# Patient Record
Sex: MALE | Race: BLACK OR AFRICAN AMERICAN | NOT HISPANIC OR LATINO | Employment: FULL TIME | URBAN - METROPOLITAN AREA
[De-identification: names, ages, dates, MRNs, and addresses within clinical notes are randomized per-mention and may not be internally consistent; named-entity substitution may affect disease eponyms.]

---

## 2022-01-21 ENCOUNTER — OFFICE VISIT (OUTPATIENT)
Dept: FAMILY MEDICINE | Facility: CLINIC | Age: 37
End: 2022-01-21
Payer: COMMERCIAL

## 2022-01-21 ENCOUNTER — ANCILLARY PROCEDURE (OUTPATIENT)
Dept: GENERAL RADIOLOGY | Facility: CLINIC | Age: 37
End: 2022-01-21
Attending: NURSE PRACTITIONER
Payer: COMMERCIAL

## 2022-01-21 VITALS
WEIGHT: 140 LBS | SYSTOLIC BLOOD PRESSURE: 122 MMHG | TEMPERATURE: 98.6 F | BODY MASS INDEX: 23.32 KG/M2 | HEIGHT: 65 IN | OXYGEN SATURATION: 97 % | DIASTOLIC BLOOD PRESSURE: 70 MMHG | HEART RATE: 69 BPM

## 2022-01-21 DIAGNOSIS — G89.29 CHRONIC MIDLINE LOW BACK PAIN WITHOUT SCIATICA: ICD-10-CM

## 2022-01-21 DIAGNOSIS — M54.50 CHRONIC MIDLINE LOW BACK PAIN WITHOUT SCIATICA: Primary | ICD-10-CM

## 2022-01-21 DIAGNOSIS — G89.29 CHRONIC MIDLINE LOW BACK PAIN WITHOUT SCIATICA: Primary | ICD-10-CM

## 2022-01-21 DIAGNOSIS — M54.50 CHRONIC MIDLINE LOW BACK PAIN WITHOUT SCIATICA: ICD-10-CM

## 2022-01-21 DIAGNOSIS — R51.9 NONINTRACTABLE HEADACHE, UNSPECIFIED CHRONICITY PATTERN, UNSPECIFIED HEADACHE TYPE: ICD-10-CM

## 2022-01-21 DIAGNOSIS — M54.2 NECK PAIN: ICD-10-CM

## 2022-01-21 DIAGNOSIS — R90.89 ABNORMAL FINDING ON MRI OF BRAIN: ICD-10-CM

## 2022-01-21 PROCEDURE — 72100 X-RAY EXAM L-S SPINE 2/3 VWS: CPT | Mod: FY | Performed by: RADIOLOGY

## 2022-01-21 PROCEDURE — 99204 OFFICE O/P NEW MOD 45 MIN: CPT | Performed by: NURSE PRACTITIONER

## 2022-01-21 PROCEDURE — 72040 X-RAY EXAM NECK SPINE 2-3 VW: CPT | Mod: FY | Performed by: RADIOLOGY

## 2022-01-21 RX ORDER — METHOCARBAMOL 500 MG/1
500 TABLET, FILM COATED ORAL 2 TIMES DAILY
Status: CANCELLED | OUTPATIENT
Start: 2022-01-21

## 2022-01-21 RX ORDER — SUMATRIPTAN 25 MG/1
25-50 TABLET, FILM COATED ORAL
Qty: 9 TABLET | Refills: 1 | Status: SHIPPED | OUTPATIENT
Start: 2022-01-21 | End: 2022-07-08

## 2022-01-21 ASSESSMENT — ENCOUNTER SYMPTOMS: HEADACHES: 1

## 2022-01-21 ASSESSMENT — MIFFLIN-ST. JEOR: SCORE: 1488.98
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
## 2022-01-21 NOTE — LETTER
February 2, 2022      Shweta Nj  [address]  [place] [address]        Dear ,    We are writing to inform you of your test results.  New patient to clinic not planning to establish care with 10-year history of headaches worsening.  Also having low back pain.  X-rays of his neck and back are completely normal.  Please encourage him to complete the MRI and labs I ordered likely at the  since he lives in the [place] area?  He also should set up with someone in that area as a primary care for follow-up.     Resulted Orders   XR Cervical Spine 2/3 Views    Narrative    CERVICAL SPINE TWO TO THREE VIEWS  1/21/2022 4:52 PM     COMPARISON: None.    HISTORY: Neck pain.      Impression    IMPRESSION: There is normal alignment of the cervical vertebrae.  Vertebral body heights of the cervical spine are normal.  Craniocervical alignment is normal. There are no fractures of the  cervical spine.    TOBIAS JENSEN MD         SYSTEM ID:  [id number]       If you have any questions or concerns, please call the clinic at the number listed above.       Sincerely,      BRYSON Ruggiero CNP

## 2022-01-24 NOTE — RESULT ENCOUNTER NOTE
Note to Staff: please call the patient to explain results.  Will need .    New patient to clinic not planning to establish care with 10-year history of headaches worsening.  Also having low back pain.  X-rays of his neck and back are completely normal.  Please encourage him to complete the MRI and labs I ordered likely at the  since he lives in the [place] area?  He also should set up with someone in that area as a primary care for follow-up.     Prescribed him a very small amount of Imitrex to be used only very sparingly as needed for severe migraine type headache pain to see if he has benefit.     Collette Harper, FNP-BC

## 2022-02-03 ENCOUNTER — TELEPHONE (OUTPATIENT)
Dept: LAB | Facility: CLINIC | Age: 37
End: 2022-02-03
Payer: COMMERCIAL

## 2022-02-03 NOTE — TELEPHONE ENCOUNTER
Situation:  Patient called to review results  Background:  MRI scheduled     Assessment:  No concerns   Recommendation:  Talked through the note from Collette Harper 1/21/22  Follow up with MRI    Lab only appointment near his address   [facility] education     Patient denied any other questions or concerns and agrees with plan.     Karo AHUJA RN   [facility] Triage

## 2022-02-11 ENCOUNTER — ANCILLARY PROCEDURE (OUTPATIENT)
Dept: MRI IMAGING | Facility: CLINIC | Age: 37
End: 2022-02-11
Attending: NURSE PRACTITIONER
Payer: COMMERCIAL

## 2022-02-11 DIAGNOSIS — R51.9 NONINTRACTABLE HEADACHE, UNSPECIFIED CHRONICITY PATTERN, UNSPECIFIED HEADACHE TYPE: ICD-10-CM

## 2022-02-11 PROCEDURE — 70551 MRI BRAIN STEM W/O DYE: CPT | Mod: GC | Performed by: RADIOLOGY

## 2022-02-13 NOTE — RESULT ENCOUNTER NOTE
Note to Staff: please call the patient to explain results.    Needs . Lives in [place] does not plan to establish care with family [facility].     DX: headaches. MRI abnormal. NO tumors or mass noted. Possible previous inflammation or infection not specific. Further imaging recommended.     Rather than order more imaging at this time I would like him to see Neurology soon to review MRI and advise further imaging and follow his care closely.     I have sent a neurology referral close to his area. They should be calling him to set this up. Please tell him this follow up is very important to further look into his abnormal MRI. If he does not get a call for this appointment have him call us back.     If any Red flag symptoms  occur he is to present to the emergency room or call 911.      Impression:   1. No acute intracranial pathology..  2. Probable focal calcification in a lateral left frontal sulcus which  could indicate prior infection or inflammation. Consider head CT for  verification.

## 2022-02-14 ENCOUNTER — APPOINTMENT (OUTPATIENT)
Dept: INTERPRETER SERVICES | Facility: CLINIC | Age: 37
End: 2022-02-14
Payer: COMMERCIAL

## 2022-06-21 NOTE — TELEPHONE ENCOUNTER
FUTURE VISIT INFORMATION      FUTURE VISIT INFORMATION:    Date: 7/8/2022    Time: 245pm    [place] [facility]  REFERRAL INFORMATION:    Referring provider:  Collette MASSEY CNP     Referring providers clinic:  [facility]     Reason for visit/diagnosis  Abnormal MRI, Headaches     RECORDS REQUESTED FROM:       Clinic name Comments Records Status Imaging Status   Internal YASH Harper-1/21/2022    MR Brain-2/11/2022 Epic PACS

## 2022-07-08 ENCOUNTER — PRE VISIT (OUTPATIENT)
Dept: NEUROLOGY | Facility: CLINIC | Age: 37
End: 2022-07-08

## 2022-07-08 ENCOUNTER — OFFICE VISIT (OUTPATIENT)
Dept: NEUROLOGY | Facility: CLINIC | Age: 37
End: 2022-07-08
Attending: NURSE PRACTITIONER
Payer: COMMERCIAL

## 2022-07-08 VITALS — DIASTOLIC BLOOD PRESSURE: 80 MMHG | HEART RATE: 60 BPM | SYSTOLIC BLOOD PRESSURE: 129 MMHG | OXYGEN SATURATION: 98 %

## 2022-07-08 DIAGNOSIS — G43.009 MIGRAINE WITHOUT AURA AND WITHOUT STATUS MIGRAINOSUS, NOT INTRACTABLE: Primary | ICD-10-CM

## 2022-07-08 DIAGNOSIS — R90.89 ABNORMAL BRAIN MRI: ICD-10-CM

## 2022-07-08 PROCEDURE — 99204 OFFICE O/P NEW MOD 45 MIN: CPT | Performed by: PSYCHIATRY & NEUROLOGY

## 2022-07-08 RX ORDER — AMITRIPTYLINE HYDROCHLORIDE 10 MG/1
10 TABLET ORAL AT BEDTIME
Qty: 30 TABLET | Refills: 11 | Status: SHIPPED | OUTPATIENT
Start: 2022-07-08 | End: 2024-02-29

## 2022-07-08 RX ORDER — SUMATRIPTAN 50 MG/1
50 TABLET, FILM COATED ORAL
Qty: 9 TABLET | Refills: 11 | Status: SHIPPED | OUTPATIENT
Start: 2022-07-08 | End: 2024-02-29

## 2022-07-08 ASSESSMENT — PAIN SCALES - GENERAL: PAINLEVEL: NO PAIN (0)

## 2022-07-08 NOTE — PROGRESS NOTES
"        Shweta Nj MRN# [id number]   Age: 37 year old YOB: 1985     Requesting physician: Collette Harper  No Ref-Primary, Physician            Assessment and Plan:     (G43.009) Migraine without aura and without status migrainosus, not intractable  (primary encounter diagnosis)  Comment: The patient is reporting symptoms most likely consistent with migraine headache.  He is very tentative on pinning down how often he is actually experiencing these headaches or how severe they are.  He has not tried any of the treatments put in place by Ms. Harper.  We discussed that he should try amitriptyline nightly as a preventative and sumatriptan as a rescue therapy.  I reassured him that both of these medicines would be considered safe.  I think he misunderstood some directions to avoid overuse of sumatriptan as an indication that the was not safe to take.  I believe at the end of the appointment he felt comfortable with the explanations and will try to use both of these medicines.  I have asked him to purposefully track his headache symptoms so we can monitor for improvement and he will be seen back in 4 to 5 months.  Plan:  1. Preventive:  amitriptyline (ELAVIL) 10 MG tablet  2. Acute: SUMAtriptan (IMITREX) 50 MG tablet            (R90.89) Abnormal brain MRI  Comment: MRI brain 2/11/2022 showed possible calcifacation in left frontal sulcus.  I do not believe that this is related to his actual neurologic symptoms and is most likely an incidental finding but will follow through with the recommended course of evaluation by neuroradiology who read the MRI scan by ordering a CT scan of the head  Plan: CT Head w/o contrast         Yessenia Buck MD           History of Present Illness:     chief complaint:   Chief Complaint   Patient presents with     Consult    Referred by Ms Leroy Nj is a 37 year old patient presenting for assessment of \"headache and abnormal MRI brain\"    He " "arrived over 30 minutes late for his appointment. He is seen with help of an oromo  via iPAD.  Interview was difficult as the patient often reported he could not answer questions even when the  would take extra time to explain.      He describes pain at top of head felt like a needle that then spread to right and left side.   Pain comes and goes on a daily basis  When present he says it lasts for at least one hour.  When asked how long it lasts he tells the interpeter he can't answer this.   He reports he doesn't take the sumatriptan prescribed by Ms Harper. He was told it \"was very strong\" and he \"shouldn't take it\" he reports he was told this information by his primary care provider.   When present he \"can't look at anything\" sounds like it is hard to look at mobile phone or [employer] screens.  No nausea  He also reports sound sensitivity.      He was sent for an MRI brain by Ms Leroy.  He had this performed and there is report of a single abnormality on sagittal images the possibly is a calcification.  Imaging reviewed by neuroradiology recommended a CT scan of the head to correlate this has not yet been ordered.        Previous Treatment Trials:  Acute therapies:  None    Chronic therapies:  None             Physical Exam:     /80   Pulse 60   SpO2 98%   The patient is well-groomed and cooperative with examination to the best of his ability.  The  reports no problems with aphasia or dysarthria.  Cranial nerves II through XII are intact.  Gait examination is stable.  The patient has normal muscle bulk, tone and strength and no evidence of any tremor.             Pertinent history/data       MR BRAIN W/O CONTRAST 2/11/2022 6:51 PM                                                                      Impression:   1. No acute intracranial pathology..  2. Probable focal calcification in a lateral left frontal sulcus which  could indicate prior infection or inflammation. Consider " head CT for  verification.     I have personally reviewed the examination and initial interpretation  and I agree with the findings.     EDYTA PRATER MD     I reviewed images I only see abnormality of T1 sagittal view will order head CT as suggested by Dr Prater for confirmation..      CERVICAL SPINE TWO TO THREE VIEWS  1/21/2022 4:52 PM      COMPARISON: None.     HISTORY: Neck pain.                                                                      IMPRESSION: There is normal alignment of the cervical vertebrae.  Vertebral body heights of the cervical spine are normal.  Craniocervical alignment is normal. There are no fractures of the  cervical spine.     TOBIAS JENSEN MD

## 2022-07-08 NOTE — LETTER
7/8/2022       RE: Jitendra Castillo  [address]  [facility] [address]     Dear Colleague,    Thank you for referring your patient, Jitendra Castillo, to the [facility] [place] [facility]. Please see a copy of my visit note below.            Shweta Nj MRN# [id number]   Age: 37 year old YOB: 1985     Requesting physician: Collette Harper  No Ref-Primary, Physician            Assessment and Plan:     (G43.009) Migraine without aura and without status migrainosus, not intractable  (primary encounter diagnosis)  Comment: The patient is reporting symptoms most likely consistent with migraine headache.  He is very tentative on pinning down how often he is actually experiencing these headaches or how severe they are.  He has not tried any of the treatments put in place by Ms. Harper.  We discussed that he should try amitriptyline nightly as a preventative and sumatriptan as a rescue therapy.  I reassured him that both of these medicines would be considered safe.  I think he misunderstood some directions to avoid overuse of sumatriptan as an indication that the was not safe to take.  I believe at the end of the appointment he felt comfortable with the explanations and will try to use both of these medicines.  I have asked him to purposefully track his headache symptoms so we can monitor for improvement and he will be seen back in 4 to 5 months.  Plan:  1. Preventive:  amitriptyline (ELAVIL) 10 MG tablet  2. Acute: SUMAtriptan (IMITREX) 50 MG tablet            (R90.89) Abnormal brain MRI  Comment: MRI brain 2/11/2022 showed possible calcifacation in left frontal sulcus.  I do not believe that this is related to his actual neurologic symptoms and is most likely an incidental finding but will follow through with the recommended course of evaluation by neuroradiology who read the MRI scan by ordering a CT scan of the  "head  Plan: CT Head w/o contrast         Yessenia Buck MD           History of Present Illness:     chief complaint:   Chief Complaint   Patient presents with     Consult    Referred by Ms Harper    Shweta Nj is a 37 year old patient presenting for assessment of \"headache and abnormal MRI brain\"    He arrived over 30 minutes late for his appointment. He is seen with help of an oromo  via iPAD.  Interview was difficult as the patient often reported he could not answer questions even when the  would take extra time to explain.      He describes pain at top of head felt like a needle that then spread to right and left side.   Pain comes and goes on a daily basis  When present he says it lasts for at least one hour.  When asked how long it lasts he tells the interpeter he can't answer this.   He reports he doesn't take the sumatriptan prescribed by Ms Harper. He was told it \"was very strong\" and he \"shouldn't take it\" he reports he was told this information by his primary care provider.   When present he \"can't look at anything\" sounds like it is hard to look at mobile phone or [employer] screens.  No nausea  He also reports sound sensitivity.      He was sent for an MRI brain by Ms Harper.  He had this performed and there is report of a single abnormality on sagittal images the possibly is a calcification.  Imaging reviewed by neuroradiology recommended a CT scan of the head to correlate this has not yet been ordered.        Previous Treatment Trials:  Acute therapies:  None    Chronic therapies:  None             Physical Exam:     /80   Pulse 60   SpO2 98%   The patient is well-groomed and cooperative with examination to the best of his ability.  The  reports no problems with aphasia or dysarthria.  Cranial nerves II through XII are intact.  Gait examination is stable.  The patient has normal muscle bulk, tone and strength and no evidence of any tremor.             " Pertinent history/data       MR BRAIN W/O CONTRAST 2/11/2022 6:51 PM                                                                      Impression:   1. No acute intracranial pathology..  2. Probable focal calcification in a lateral left frontal sulcus which  could indicate prior infection or inflammation. Consider head CT for  verification.     I have personally reviewed the examination and initial interpretation  and I agree with the findings.     EDYTA PRATER MD     I reviewed images I only see abnormality of T1 sagittal view will order head CT as suggested by Dr Prater for confirmation..      CERVICAL SPINE TWO TO THREE VIEWS  1/21/2022 4:52 PM      COMPARISON: None.     HISTORY: Neck pain.     IMPRESSION: There is normal alignment of the cervical vertebrae.  Vertebral body heights of the cervical spine are normal.  Craniocervical alignment is normal. There are no fractures of the  cervical spine.     TOBIAS JENSEN MD      Sincerely,    Yessenia Buck MD

## 2022-07-30 ENCOUNTER — OFFICE VISIT (OUTPATIENT)
Dept: URGENT CARE | Facility: URGENT CARE | Age: 37
End: 2022-07-30
Payer: COMMERCIAL

## 2022-07-30 VITALS
BODY MASS INDEX: 23.14 KG/M2 | SYSTOLIC BLOOD PRESSURE: 114 MMHG | OXYGEN SATURATION: 97 % | WEIGHT: 138.89 LBS | DIASTOLIC BLOOD PRESSURE: 68 MMHG | HEART RATE: 50 BPM | RESPIRATION RATE: 16 BRPM | TEMPERATURE: 98.6 F | HEIGHT: 65 IN

## 2022-07-30 DIAGNOSIS — K04.7 TOOTH INFECTION: Primary | ICD-10-CM

## 2022-07-30 PROCEDURE — 99203 OFFICE O/P NEW LOW 30 MIN: CPT | Performed by: PHYSICIAN ASSISTANT

## 2022-07-30 NOTE — PROGRESS NOTES
Tooth infection  - amoxicillin-clavulanate (AUGMENTIN) 875-125 MG tablet; Take 1 tablet by mouth 2 times daily for 10 days    Patient will need to follow-up with dentist in the next 1 to 2 weeks for tooth extraction.    Patient was advised to return to clinic if symptoms do not improve in the amount of time specified in the AVS or if symptoms worsen. Patient educated on red flag symptoms and asked to go directly to the ED if symptoms present themselves.     Link Castro PA-C  [facility] URGENT [facility]ARE    Subjective   37 year old who presents to clinic today for the following health issues:    Urgent Care, Dental Pain, and Fever       HPI     Patient visits today for 1 week of worsening right upper molar pain.  Patient has had pain with this tooth off and on over the last several years but it has not been an issue for some time.  Patient denies any fever currently.  He has pain with chewing as well as facial swelling on the right side.  Patient has not been seen by dentist for this issue.    Review of Systems   Review of Systems   See HPI     Objective    Temp: 98.6  F (37  C) Temp src: Temporal BP: 114/68 Pulse: 50   Resp: 16 SpO2: 97 %       Physical Exam   Physical Exam  Constitutional:       General: He is not in acute distress.     Appearance: Normal appearance. He is normal weight. He is not ill-appearing, toxic-appearing or diaphoretic.   HENT:      Head: Normocephalic and atraumatic.      Mouth/Throat:      Lips: Pink.      Mouth: Mucous membranes are moist.      Dentition: Dental tenderness and gingival swelling present. No gum lesions.     Neurological:      Mental Status: He is alert.   Psychiatric:         Mood and Affect: Mood normal.         Behavior: Behavior normal.         Thought Content: Thought content normal.         Judgment: Judgment normal.          No results found for this or any previous visit (from the past 24 hour(s)).

## 2022-08-01 ENCOUNTER — APPOINTMENT (OUTPATIENT)
Dept: CT IMAGING | Facility: CLINIC | Age: 37
End: 2022-08-01
Attending: FAMILY MEDICINE
Payer: COMMERCIAL

## 2022-08-01 ENCOUNTER — HOSPITAL ENCOUNTER (EMERGENCY)
Facility: CLINIC | Age: 37
Discharge: HOME | End: 2022-08-01
Attending: FAMILY MEDICINE | Admitting: FAMILY MEDICINE
Payer: COMMERCIAL

## 2022-08-01 VITALS
DIASTOLIC BLOOD PRESSURE: 70 MMHG | TEMPERATURE: 100 F | OXYGEN SATURATION: 98 % | HEART RATE: 78 BPM | SYSTOLIC BLOOD PRESSURE: 116 MMHG | RESPIRATION RATE: 16 BRPM

## 2022-08-01 DIAGNOSIS — K04.7 DENTAL ABSCESS: ICD-10-CM

## 2022-08-01 LAB
ANION GAP SERPL CALCULATED.3IONS-SCNC: 3 MMOL/L (ref 3–14)
BASOPHILS # BLD AUTO: 0 10E3/UL (ref 0–0.2)
BASOPHILS NFR BLD AUTO: 0 %
BUN SERPL-MCNC: 11 MG/DL (ref 7–30)
CALCIUM SERPL-MCNC: 9.3 MG/DL (ref 8.5–10.1)
CHLORIDE BLD-SCNC: 105 MMOL/L (ref 94–109)
CO2 SERPL-SCNC: 29 MMOL/L (ref 20–32)
CREAT SERPL-MCNC: 0.69 MG/DL (ref 0.66–1.25)
EOSINOPHIL # BLD AUTO: 0 10E3/UL (ref 0–0.7)
EOSINOPHIL NFR BLD AUTO: 1 %
ERYTHROCYTE [DISTWIDTH] IN BLOOD BY AUTOMATED COUNT: 11.8 % (ref 10–15)
GFR SERPL CREATININE-BSD FRML MDRD: >90 ML/MIN/1.73M2
GLUCOSE BLD-MCNC: 103 MG/DL (ref 70–99)
HCT VFR BLD AUTO: 44.4 % (ref 40–53)
HGB BLD-MCNC: 14.9 G/DL (ref 13.3–17.7)
IMM GRANULOCYTES # BLD: 0 10E3/UL
IMM GRANULOCYTES NFR BLD: 0 %
LYMPHOCYTES # BLD AUTO: 0.9 10E3/UL (ref 0.8–5.3)
LYMPHOCYTES NFR BLD AUTO: 11 %
MCH RBC QN AUTO: 29.7 PG (ref 26.5–33)
MCHC RBC AUTO-ENTMCNC: 33.6 G/DL (ref 31.5–36.5)
MCV RBC AUTO: 89 FL (ref 78–100)
MONOCYTES # BLD AUTO: 0.8 10E3/UL (ref 0–1.3)
MONOCYTES NFR BLD AUTO: 9 %
NEUTROPHILS # BLD AUTO: 6.4 10E3/UL (ref 1.6–8.3)
NEUTROPHILS NFR BLD AUTO: 79 %
NRBC # BLD AUTO: 0 10E3/UL
NRBC BLD AUTO-RTO: 0 /100
PLATELET # BLD AUTO: 190 10E3/UL (ref 150–450)
POTASSIUM BLD-SCNC: 3.8 MMOL/L (ref 3.4–5.3)
RBC # BLD AUTO: 5.01 10E6/UL (ref 4.4–5.9)
SODIUM SERPL-SCNC: 137 MMOL/L (ref 133–144)
WBC # BLD AUTO: 8.2 10E3/UL (ref 4–11)

## 2022-08-01 PROCEDURE — 250N000009 HC RX 250: Performed by: FAMILY MEDICINE

## 2022-08-01 PROCEDURE — 250N000011 HC RX IP 250 OP 636: Performed by: FAMILY MEDICINE

## 2022-08-01 PROCEDURE — 70487 CT MAXILLOFACIAL W/DYE: CPT

## 2022-08-01 PROCEDURE — 99285 EMERGENCY DEPT VISIT HI MDM: CPT | Mod: 25 | Performed by: FAMILY MEDICINE

## 2022-08-01 PROCEDURE — 36415 COLL VENOUS BLD VENIPUNCTURE: CPT | Performed by: FAMILY MEDICINE

## 2022-08-01 PROCEDURE — 99285 EMERGENCY DEPT VISIT HI MDM: CPT | Performed by: FAMILY MEDICINE

## 2022-08-01 PROCEDURE — 41800 DRAINAGE OF GUM LESION: CPT

## 2022-08-01 PROCEDURE — 85025 COMPLETE CBC W/AUTO DIFF WBC: CPT | Performed by: FAMILY MEDICINE

## 2022-08-01 PROCEDURE — 250N000013 HC RX MED GY IP 250 OP 250 PS 637: Performed by: FAMILY MEDICINE

## 2022-08-01 PROCEDURE — 82310 ASSAY OF CALCIUM: CPT | Performed by: FAMILY MEDICINE

## 2022-08-01 RX ORDER — KETOROLAC TROMETHAMINE 15 MG/ML
15 INJECTION, SOLUTION INTRAMUSCULAR; INTRAVENOUS ONCE
Status: COMPLETED | OUTPATIENT
Start: 2022-08-01 | End: 2022-08-01

## 2022-08-01 RX ORDER — HYDROCODONE BITARTRATE AND ACETAMINOPHEN 5; 325 MG/1; MG/1
1 TABLET ORAL EVERY 6 HOURS PRN
Qty: 10 TABLET | Refills: 0 | Status: SHIPPED | OUTPATIENT
Start: 2022-08-01 | End: 2022-08-04

## 2022-08-01 RX ORDER — IOPAMIDOL 755 MG/ML
100 INJECTION, SOLUTION INTRAVASCULAR ONCE
Status: COMPLETED | OUTPATIENT
Start: 2022-08-01 | End: 2022-08-01

## 2022-08-01 RX ORDER — IBUPROFEN 200 MG
600 TABLET ORAL EVERY 6 HOURS PRN
Qty: 30 TABLET | Refills: 0 | Status: SHIPPED | OUTPATIENT
Start: 2022-08-01 | End: 2024-02-05

## 2022-08-01 RX ADMIN — IOPAMIDOL 75 ML: 755 INJECTION, SOLUTION INTRAVENOUS at 15:40

## 2022-08-01 RX ADMIN — SODIUM CHLORIDE 50 ML: 9 INJECTION, SOLUTION INTRAVENOUS at 15:40

## 2022-08-01 RX ADMIN — AMOXICILLIN AND CLAVULANATE POTASSIUM 1 TABLET: 875; 125 TABLET, FILM COATED ORAL at 17:15

## 2022-08-01 RX ADMIN — KETOROLAC TROMETHAMINE 15 MG: 15 INJECTION, SOLUTION INTRAMUSCULAR; INTRAVENOUS at 14:55

## 2022-08-01 NOTE — ED TRIAGE NOTES
Patient's right cheek is swollen. Reports it has been swollen for 3 days. Temp of 100.0 in triage. Airway within normal limits, no respiratory involvement suspected.      Triage Assessment     Row Name 08/01/22 1033       Triage Assessment (Adult)    Airway WDL WDL       Respiratory WDL    Respiratory WDL WDL       Skin Circulation/Temperature WDL    Skin Circulation/Temperature WDL WDL       Cardiac WDL    Cardiac WDL WDL       Peripheral/Neurovascular WDL    Peripheral Neurovascular WDL WDL       Cognitive/Neuro/Behavioral WDL    Cognitive/Neuro/Behavioral WDL WDL

## 2022-08-01 NOTE — ED PROVIDER NOTES
[facility] EMERGENCY DEPARTMENT ([facility])     August 1, 2022     History     Chief Complaint   Patient presents with     Facial Swelling     Patient's right cheek is swollen. Reports it started 3 days ago. Temp of 100.0 in triage     HPI  Jitendra Castillo is a 37 year old [place] male with a past medical history including migraine w/o aura and w/o status migrainosus who presents to the Emergency Department for evaluation of right-sided facial swelling.  Patient reports that for the last 3 days he has had pain and swelling in his right cheek. He states that he has been having intermittent right-sided toothaches that preceded the facial swelling. Has had decreased appetite due to swelling and pain. History limited by interpretation technical difficulties.      Past Medical History  No past medical history on file.  No past surgical history on file.  amoxicillin-clavulanate (AUGMENTIN) 875-125 MG tablet  HYDROcodone-acetaminophen (NORCO) 5-325 MG tablet  ibuprofen (ADVIL/MOTRIN) 200 MG tablet  amitriptyline (ELAVIL) 10 MG tablet  SUMAtriptan (IMITREX) 50 MG tablet      No Known Allergies  Family History  No family history on file.  Social History   Social History     Tobacco Use     Smoking status: Never Smoker     Smokeless tobacco: Never Used   Substance Use Topics     Alcohol use: Never     Drug use: Never      Past medical history, past surgical history, medications, allergies, family history, and social history were reviewed with the patient. No additional pertinent items.       Review of Systems  A complete review of systems was performed with pertinent positives and negatives noted in the HPI, and all other systems negative.    Physical Exam   BP: 117/76  Pulse: 83  Temp: 100  F (37.8  C)  Resp: 14  SpO2: 97 %  Physical Exam  Vitals and nursing note reviewed.   Constitutional:       General: He is not in acute distress.     Appearance: He is not diaphoretic.   HENT:      Head: Atraumatic.      Nose:  Nose normal.      Mouth/Throat:      Dentition: Abnormal dentition.        Comments: Patient has obvious right facial swelling.  Please see photo below  Eyes:      General: No scleral icterus.     Pupils: Pupils are equal, round, and reactive to light.   Cardiovascular:      Heart sounds: Normal heart sounds.   Pulmonary:      Effort: No respiratory distress.      Breath sounds: Normal breath sounds.   Abdominal:      General: Bowel sounds are normal.      Palpations: Abdomen is soft.      Tenderness: There is no abdominal tenderness.   Musculoskeletal:         General: No tenderness.   Skin:     General: Skin is warm.      Findings: No rash.   Neurological:      General: No focal deficit present.      Mental Status: He is alert and oriented to person, place, and time.               ED Course     2:11 PM  The patient was seen and examined by Praful Sanford MD in Room HW04.    Procedures       The medical record was reviewed and interpreted.  Current labs reviewed and interpreted.  Current images reviewed and interpreted: CT face.  Managed outpatient prescription medications.   utilized.              Results for orders placed or performed during the hospital encounter of 08/01/22   CT Facial Bones with Contrast     Status: None (Preliminary result)    Narrative    CT SCAN OF THE FACE WITH CONTRAST 8/1/2022 3:54 PM     HISTORY: Facial swelling, evaluate for abscess or other cause.    TECHNIQUE:  Axial scans of the face following intravenous contrast  with sagittal and coronal reformations. Radiation dose for this scan  was reduced using automated exposure control, adjustment of the mA  and/or kV according to patient size, or iterative reconstruction  technique. 75mL Isovue 370    COMPARISON: None.    FINDINGS: There is a carious erosion with periapical lucency  suggestive of periapical abscess involving the right first maxillary  molar. Overlying the labial cortex of the right anterolateral  maxillary  alveolus adjacent to this periapical lucency, there is a  rim-enhancing fluid collection measuring approximately 19 mm x 10 mm x  16 mm, concerning for a subperiosteal abscess. Small focus of air  noted in the anterior superior aspect of the fluid collection (series  3 image 28). Asymmetric soft tissue swelling and subcutaneous fat  stranding of the right face, right buccal space, and right  retromaxillary region, likely reflecting cellulitis.    Visualized intracranial contents appear grossly unremarkable. Orbits  appear normal. Mild polypoid mucosal thickening in the right maxillary  sinus, which may be odontogenic in nature. Otherwise, the paranasal  sinuses are essentially clear. The mastoid and middle ear cavities are  clear. No facial fracture seen.    Leftward nasal septal deviation. The  and parapharyngeal  spaces appear normal. Scattered small lymph nodes in the upper neck,  likely reactive.      Impression    IMPRESSION:  Dental carious erosion with periapical lucency suggestive of  periapical abscess involving the right first maxillary molar (tooth  #3). Rim-enhancing fluid collection overlying the adjacent right  anterolateral aspect of the maxillary alveolus measuring up to 19 mm,  concerning for a subperiosteal abscess. Superficial soft tissue  swelling and subcutaneous fat stranding in the right face, buccal  space, and retromaxillary space, concerning for cellulitis. Recommend  clinical correlation. Recommend dental consultation.      Basic metabolic panel     Status: Abnormal   Result Value Ref Range    Sodium 137 133 - 144 mmol/L    Potassium 3.8 3.4 - 5.3 mmol/L    Chloride 105 94 - 109 mmol/L    Carbon Dioxide (CO2) 29 20 - 32 mmol/L    Anion Gap 3 3 - 14 mmol/L    Urea Nitrogen 11 7 - 30 mg/dL    Creatinine 0.69 0.66 - 1.25 mg/dL    Calcium 9.3 8.5 - 10.1 mg/dL    Glucose 103 (H) 70 - 99 mg/dL    GFR Estimate >90 >60 mL/min/1.73m2   CBC with platelets and differential     Status: None    Result Value Ref Range    WBC Count 8.2 4.0 - 11.0 10e3/uL    RBC Count 5.01 4.40 - 5.90 10e6/uL    Hemoglobin 14.9 13.3 - 17.7 g/dL    Hematocrit 44.4 40.0 - 53.0 %    MCV 89 78 - 100 fL    MCH 29.7 26.5 - 33.0 pg    MCHC 33.6 31.5 - 36.5 g/dL    RDW 11.8 10.0 - 15.0 %    Platelet Count 190 150 - 450 10e3/uL    % Neutrophils 79 %    % Lymphocytes 11 %    % Monocytes 9 %    % Eosinophils 1 %    % Basophils 0 %    % Immature Granulocytes 0 %    NRBCs per 100 WBC 0 <1 /100    Absolute Neutrophils 6.4 1.6 - 8.3 10e3/uL    Absolute Lymphocytes 0.9 0.8 - 5.3 10e3/uL    Absolute Monocytes 0.8 0.0 - 1.3 10e3/uL    Absolute Eosinophils 0.0 0.0 - 0.7 10e3/uL    Absolute Basophils 0.0 0.0 - 0.2 10e3/uL    Absolute Immature Granulocytes 0.0 <=0.4 10e3/uL    Absolute NRBCs 0.0 10e3/uL   CBC with platelets differential     Status: None    Narrative    The following orders were created for panel order CBC with platelets differential.  Procedure                               Abnormality         Status                     ---------                               -----------         ------                     CBC with platelets and d...[[id number]]                      Final result                 Please view results for these tests on the individual orders.     Medications   amoxicillin-clavulanate (AUGMENTIN) 875-125 MG per tablet 1 tablet (has no administration in time range)   ketorolac (TORADOL) injection 15 mg (15 mg Intravenous Given 8/1/22 1455)   iopamidol (ISOVUE-370) solution 100 mL (75 mLs Intravenous Given 8/1/22 1540)   sodium chloride 0.9 % bag 100mL (50 mLs Intravenous Given 8/1/22 1540)        Assessments & Plan (with Medical Decision Making)   A 37-year-old otherwise healthy male presenting with right upper dental pain and facial swelling.  Differential diagnosis dental abscess abscess, buccal cellulitis, facial cellulitis, sinusitis, less likely neoplasm or mass lesion, trauma.  On exam his temperature is 100 his  other vitals are normal he does not appear systemically ill.  He has obvious right facial swelling, tenderness along the upper molars #3 and 4, and tenderness along the lateral gumline.  The remainder of a complete physical exam is normal.  Discussed with oral surgery, obtained facial CT which confirms abscess with possible cellulitis, as is documented above.  He was given Augmentin and dentistry was consulted to see the patient in the ED.  Awaiting their consultation for assistance with definitive management, if they are unable would consider oral surgery consult.  Will sign out to evening physician at shift change.    I have reviewed the nursing notes. I have reviewed the findings, diagnosis, plan and need for follow up with the patient.    New Prescriptions    AMOXICILLIN-CLAVULANATE (AUGMENTIN) 875-125 MG TABLET    Take 1 tablet by mouth 2 times daily for 7 days    HYDROCODONE-ACETAMINOPHEN (NORCO) 5-325 MG TABLET    Take 1 tablet by mouth every 6 hours as needed for severe pain    IBUPROFEN (ADVIL/MOTRIN) 200 MG TABLET    Take 3 tablets (600 mg) by mouth every 6 hours as needed for mild pain       Final diagnoses:   Dental abscess     I, Rosio Sanford, am serving as a trained medical scribe to document services personally performed by Praful Sanford MD, based on the provider's statements to me.   IPraful MD, was physically present and have reviewed and verified the accuracy of this note documented by Rosio Sanford.   --  Praful Sanford MD  [facility] EMERGENCY DEPARTMENT  8/1/2022     Praful Sanford MD  08/01/22 9928

## 2022-08-01 NOTE — DISCHARGE INSTRUCTIONS
Thank you for choosing [facility].     Please closely monitor for further symptoms. Return to the Emergency Department if you develop any new or worsening signs or symptoms.    If you received any opiate pain medications or sedatives during your visit, please do not drive for at least 8 hours.     Labs, cultures or final xray interpretations may still need to be reviewed.  We will call you if your plan of care needs to be changed.    Please follow up with dentistry as instructed.    Many of these clinics offer a sliding fee option for patients that qualify, and see patients on a walk-in or same day basis. Please call each clinic directly. As services, hours, fees and policies vary greatly.    [place]:  [facility]     [phone]  [facility] ([facility]) [phone]  [facility]  [phone]  [facility]      [phone]   [facility]    [phone]  [facility]  [phone]  [facility] [phone]  [facility]     [phone]  [facility]   [phone]  [facility] (cash only)   [phone]  [facility]    [phone] (adults)          [phone] (children)    [place]:  [facility]     [phone]; [phone]  [facility]     [phone]  [facility]     [phone]  [facility] (free, limited)    [phone]    Multiple Locations:  [facility]       [phone]

## 2022-08-02 NOTE — CONSULTS
"..  Dental Service Consultation      Jitendra Castillo MRN# [id number]  YOB: 1985 Age: 37 year old  Date of Admission: 8/1/2022    Reason for consult: I was asked by Dr. Sanford to evaluate this patient for a dental abcess.    Chief Complaint:  \"My face is swollen.\"    Assessment and Plan:  Assessment:   Radiographic exam: Dental CT reveals partially edentulous adult dentition. Condyles seated in fossa bilaterally, maxillary sinuses clear bilaterally. Coronal radiolucencies consistent with decay on teeth #3. Periapical radiolucencies consistent with periapical abscesses on teeth #3.      Extraoral exam:      There is a carious erosion with periapical lucency  suggestive of periapical abscess involving the right first maxillary  molar. Overlying the labial cortex of the right anterolateral  maxillary alveolus adjacent to this periapical lucency, there is a  rim-enhancing fluid collection measuring approximately 19 mm x 10 mm x  16 mm, concerning for a subperiosteal abscess. Small focus of air  noted in the anterior superior aspect of the fluid collection (series  3 image 28). Asymmetric soft tissue swelling and subcutaneous fat  stranding of the right face, right buccal space, and right  retromaxillary region, likely reflecting cellulitis.     Intraoral exam: Reveals decayed and poor dentition. Patient asymptomatic to palpation and percussion. Mallampati II. TIFFANIE ~45mm.         PROCEDURE: incision and drainage of abscess  Performing Physician: Dr. Flores    PROCEDURE:    The area was prepared and draped in the usual, sterile manner. The site  was anesthetized with 3% lidocaine with epinephrine. A linear incision  along the local skin lines of the posterior right (above teeth #4/#5) was made and the purulent material expressed.  The abcess was explored thoroughly and sequestered pockets were opened.  Bleeding was minimal.     Followup: The patient tolerated the procedure well without complications.  " Standard post-procedure care is explained and return  precautions are given.    Plan:  - Extraction of at least tooth #3 due to caries and periapical abcsess. Additional extractions upon further imaging and examination at the clinic if deemed necessary.   - Patient will be seen at Dental Clinic as inpatient for extraction of teeth (scheduling team of [facility] will be notified). Patient is aware that they will not be sedated and that there is only nitrous oxide that the clinic offers. Patient accepts to be seen at the [facility] for their teeth.  - Patient will be seen at the [facility] once the patient is stable enough to transfer. If the patient is not stable enough to transfer coordination with our scheduling team may be necessary to see patient in the operating room for tooth extractions.   - Physician of the patient has been notified of the procedure on how to make an appointment for the patient (CALL: [phone] and make the arrangements for moving the patient to the clinic or to coordinate operating room scheduling).     Clinic information:   [facility]  [address] [place] [id number]  [phone]      History is obtained from the patient      History of Present Illness:  This patient is a 37 year old male who presents to ED with a facial abscess above #3.     Past Medical History:  No past medical history on file.    Past Surgical History:  No past surgical history on file.    Social History:  Social History     Tobacco Use     Smoking status: Never Smoker     Smokeless tobacco: Never Used   Substance Use Topics     Alcohol use: Never       Family History:  No family history on file.    Immunizations:    There is no immunization history on file for this patient.    Allergies:  No Known Allergies    Medications:  No current Epic-ordered facility-administered medications on file.     Current Outpatient Medications Ordered in Epic    Medication     amoxicillin-clavulanate (AUGMENTIN) 875-125 MG tablet     HYDROcodone-acetaminophen (NORCO) 5-325 MG tablet     ibuprofen (ADVIL/MOTRIN) 200 MG tablet     amitriptyline (ELAVIL) 10 MG tablet     SUMAtriptan (IMITREX) 50 MG tablet       Review of Systems:  The 10 point Review of Systems is negative other than noted in the HPI    Physical Exam:  Vitals were reviewed  Temp: 100  F (37.8  C) Temp src: Oral BP: 116/70 Pulse: 78   Resp: 16 SpO2: 98 % O2 Device: None (Room air)          Head and neck exam:  HEENT: NC/AT, EOMI, PERRL, No submandibular lymphadenopathy, no induration or erythema, no abnormal skin lesions, inferior border of mandible is palpable bilaterally, TIFFANIE >45mm. No TMJ discomfort bilaterally. FOM soft and non tender. No clicking of TMJ on opening and lateral movements.    Data:  Radiographic interpretation: Dental CT taken on 08/01/22  Osseous pathology:   Pulpal Pathology: Irreversible pulpitis or Necrotic  Periodontal Pathology: Periapical abscess tooth #3  Caries: Noted on tooth # 3      The patient was discussed wit Dr. Byrne.      PGY1 Nuha Flores  Pager: [phone]

## 2022-08-12 ENCOUNTER — HOSPITAL ENCOUNTER (EMERGENCY)
Facility: CLINIC | Age: 37
Discharge: HOME | End: 2022-08-12
Attending: EMERGENCY MEDICINE | Admitting: EMERGENCY MEDICINE
Payer: COMMERCIAL

## 2022-08-12 VITALS
TEMPERATURE: 99.4 F | SYSTOLIC BLOOD PRESSURE: 120 MMHG | HEIGHT: 65 IN | HEART RATE: 81 BPM | BODY MASS INDEX: 22.99 KG/M2 | DIASTOLIC BLOOD PRESSURE: 83 MMHG | WEIGHT: 138 LBS | OXYGEN SATURATION: 99 % | RESPIRATION RATE: 16 BRPM

## 2022-08-12 DIAGNOSIS — K08.89 PAIN, DENTAL: ICD-10-CM

## 2022-08-12 PROCEDURE — 99282 EMERGENCY DEPT VISIT SF MDM: CPT | Performed by: EMERGENCY MEDICINE

## 2022-08-12 ASSESSMENT — ENCOUNTER SYMPTOMS
EYE REDNESS: 0
WEAKNESS: 0
FEVER: 0
NUMBNESS: 0
NAUSEA: 0
HEADACHES: 0
CHOKING: 0
EYE PAIN: 0
TROUBLE SWALLOWING: 0
VOMITING: 0
SORE THROAT: 0

## 2022-08-12 ASSESSMENT — ACTIVITIES OF DAILY LIVING (ADL): ADLS_ACUITY_SCORE: 33

## 2022-08-12 NOTE — DISCHARGE INSTRUCTIONS
Follow-up with Dental Clinic for exraction.  Clinic information:   [facility]  [address], [place] [address] [phone]    Return if fever, facial swelling, or other concerns.

## 2022-08-12 NOTE — ED PROVIDER NOTES
"ED Provider Note  [facility]      History     Chief Complaint   Patient presents with     Dental Pain     Started having R upper tooth pain last night, per pt he was told by the dentist few weeks before that it needed to \"pulled\" but he was not having pain at that time so he said he will come back for the tooth extraction. Now pt is in the ED requesting to have his tooth \"pulled\" Pt denies taking any pain meds     HPI  Jitendra Castillo is a 37 year old male with a history of dental abscess who presents to the emergency department seeking tooth extraction.  Patient was seen in the emergency department on 8/3 for a dental abscess.  CT was performed and patient was seen by dentistry.  His abscess was incised and drained.  He was sent home on Augmentin.  He states he completed the course of antibiotics.  He did not seek extraction until this morning when his tooth began to become painful again last night.  He denies any significant facial swelling.  No fever.  No intraoral drainage.  Patient denies any difficulty swallowing or breathing.    Past Medical History  No past medical history on file.  No past surgical history on file.  amitriptyline (ELAVIL) 10 MG tablet  ibuprofen (ADVIL/MOTRIN) 200 MG tablet  SUMAtriptan (IMITREX) 50 MG tablet      No Known Allergies  Family History  No family history on file.  Social History   Social History     Tobacco Use     Smoking status: Never Smoker     Smokeless tobacco: Never Used   Substance Use Topics     Alcohol use: Never     Drug use: Never      Past medical history, past surgical history, medications, allergies, family history, and social history were reviewed with the patient. No additional pertinent items.       Review of Systems   Constitutional: Negative for fever.   HENT: Positive for dental problem. Negative for sore throat and trouble swallowing.    Eyes: Negative for pain and redness.   Respiratory: Negative for choking.  " "  Cardiovascular: Negative for chest pain.   Gastrointestinal: Negative for nausea and vomiting.   Neurological: Negative for weakness, numbness and headaches.   All other systems reviewed and are negative.    A complete review of systems was performed with pertinent positives and negatives noted in the HPI, and all other systems negative.    Physical Exam   BP: 133/85  Pulse: 92  Temp: 99.4  F (37.4  C)  Resp: 16  Height: 165 cm (5' 4.96\")  Weight: 62.6 kg (138 lb)  SpO2: 98 %  Physical Exam  Vitals and nursing note reviewed.   Constitutional:       Appearance: Normal appearance.   HENT:      Head: Normocephalic and atraumatic.      Jaw: There is normal jaw occlusion.      Nose: Nose normal.      Mouth/Throat:      Dentition: Abnormal dentition. Dental tenderness present. No gingival swelling.      Tongue: No lesions.      Pharynx: No pharyngeal swelling or posterior oropharyngeal erythema.        Comments: Floor of mouth soft and nontender.  Eyes:      Extraocular Movements: Extraocular movements intact.      Pupils: Pupils are equal, round, and reactive to light.   Musculoskeletal:      Cervical back: Normal range of motion. No rigidity.   Neurological:      Mental Status: He is alert.         ED Course      Procedures       The medical record was reviewed and interpreted.   utilized.        No results found for any visits on 08/12/22.  Medications - No data to display     Assessments & Plan (with Medical Decision Making)   37 year old male with history of recent dental abscess to the emergency department seeking tooth extraction.  He developed recurrent pain and some mild swelling last night.  He is not febrile here in the emergency department appears clinically well.  The patient does not have significant or appreciable facial swelling.  He has no gingival swelling but does have tenderness in the tooth #3 vicinity where previous dental abscess was located.  The patient attempted to follow-up at a " general dentistry walk-in site today and waited for 2 hours and ultimately left.  It is not clear if he received the information to follow-up at the [facility] dental clinic as he had been advised by the dental resident that saw the patient in the emergency department.  He was provided that information on his discharge sheet.  He did not desire any further evaluation.  Patient discharged with plan to follow-up in the dental clinic.    I have reviewed the nursing notes. I have reviewed the findings, diagnosis, plan and need for follow up with the patient.    New Prescriptions    No medications on file       Final diagnoses:   Pain, dental     Chart documentation was completed with Dragon voice-recognition software. Even though reviewed, this chart may still contain some grammatical, spelling, and word errors.     --  Popeye Caruso Md  [facility] EMERGENCY DEPARTMENT  8/12/2022     Popeye Caurso MD  08/12/22 5167

## 2022-08-12 NOTE — ED TRIAGE NOTES
"Started having R upper tooth pain last night, per pt he was told by the dentist few weeks before that it needed to \"pulled\" but he was not having pain at that time so he said he will come back for the tooth extraction. Now pt is in the ED requesting to have his tooth \"pulled\" Pt denies taking any pain meds     Triage Assessment     Row Name 08/12/22 1033       Triage Assessment (Adult)    Airway WDL WDL       Respiratory WDL    Respiratory WDL WDL       Skin Circulation/Temperature WDL    Skin Circulation/Temperature WDL WDL       Cardiac WDL    Cardiac WDL WDL       Peripheral/Neurovascular WDL    Peripheral Neurovascular WDL WDL       Cognitive/Neuro/Behavioral WDL    Cognitive/Neuro/Behavioral WDL WDL              "

## 2022-12-22 ENCOUNTER — TELEPHONE (OUTPATIENT)
Dept: NEUROLOGY | Facility: CLINIC | Age: 37
End: 2022-12-22

## 2022-12-22 NOTE — TELEPHONE ENCOUNTER
Called pt (via interpretive services  ID#  [id number]) unable to reach pt message stated person dialed is not receiving calls at this time.

## 2022-12-22 NOTE — TELEPHONE ENCOUNTER
Called pt (via interpretive services) per  Dhiraj unable to reach pt - it is a busy signal. She tried a few times and recommended I call back.

## 2022-12-22 NOTE — TELEPHONE ENCOUNTER
Per Dr. Buck, Can we please call this gentleman through the [employer]  and switch him to telephone or video if he wants to keep appt on Friday Dec 23rd rather than in person.        Called pt (via interpretive services  Roz ID#  [id number]) unable to reach pt message stated person dialed is not receiving calls at this time.

## 2022-12-23 NOTE — TELEPHONE ENCOUNTER
Called pt's # twice and was not able to get through nor leave a voicemail  Phone rings twice then disconnects.

## 2023-03-01 ENCOUNTER — HOSPITAL ENCOUNTER (EMERGENCY)
Facility: CLINIC | Age: 38
Discharge: HOME | End: 2023-03-01
Attending: EMERGENCY MEDICINE | Admitting: EMERGENCY MEDICINE

## 2023-03-01 VITALS
OXYGEN SATURATION: 100 % | TEMPERATURE: 98.4 F | HEART RATE: 57 BPM | SYSTOLIC BLOOD PRESSURE: 131 MMHG | DIASTOLIC BLOOD PRESSURE: 80 MMHG | RESPIRATION RATE: 18 BRPM

## 2023-03-01 DIAGNOSIS — S20.221A CONTUSION OF RIGHT SIDE OF BACK, INITIAL ENCOUNTER: ICD-10-CM

## 2023-03-01 PROCEDURE — 99283 EMERGENCY DEPT VISIT LOW MDM: CPT

## 2023-03-01 PROCEDURE — 250N000013 HC RX MED GY IP 250 OP 250 PS 637: Performed by: EMERGENCY MEDICINE

## 2023-03-01 PROCEDURE — 99283 EMERGENCY DEPT VISIT LOW MDM: CPT | Performed by: EMERGENCY MEDICINE

## 2023-03-01 RX ORDER — ACETAMINOPHEN 325 MG/1
975 TABLET ORAL ONCE
Status: COMPLETED | OUTPATIENT
Start: 2023-03-01 | End: 2023-03-01

## 2023-03-01 RX ORDER — NAPROXEN 500 MG/1
500 TABLET ORAL 2 TIMES DAILY WITH MEALS
Qty: 20 TABLET | Refills: 0 | Status: SHIPPED | OUTPATIENT
Start: 2023-03-01 | End: 2023-03-08

## 2023-03-01 RX ORDER — ACETAMINOPHEN 500 MG
1000 TABLET ORAL EVERY 8 HOURS PRN
Qty: 40 TABLET | Refills: 0 | Status: SHIPPED | OUTPATIENT
Start: 2023-03-01 | End: 2023-03-06

## 2023-03-01 RX ORDER — IBUPROFEN 600 MG/1
600 TABLET, FILM COATED ORAL ONCE
Status: COMPLETED | OUTPATIENT
Start: 2023-03-01 | End: 2023-03-01

## 2023-03-01 RX ADMIN — IBUPROFEN 600 MG: 600 TABLET ORAL at 11:38

## 2023-03-01 RX ADMIN — ACETAMINOPHEN 975 MG: 325 TABLET ORAL at 11:38

## 2023-03-01 NOTE — ED PROVIDER NOTES
[facility] EMERGENCY DEPARTMENT ([facility])     March 1, 2023     History     Chief Complaint   Patient presents with     Back Pain     Fell on Monday      HPI  Jitendra Castillo is a 38 year old previously healthy male who had a mechanical slip and fall down the stairs falling on his right low back. History taken through [employer] video . Patient denies midline back pain and has not taken any medications. This occurred Monday morning while he was going to work, since then he has had continued mild to moderate pain and is seeking medical evaluation. Denies shortness of breath or chest pain or abdominal pain. No anticoagulants.     This part of the medical record was transcribed by Rosio Sanford, Medical Scribe, from a dictation done by Nain Gómez MD.      Past Medical History  No past medical history on file.  No past surgical history on file.  acetaminophen (TYLENOL) 500 MG tablet  naproxen (NAPROSYN) 500 MG tablet  amitriptyline (ELAVIL) 10 MG tablet  ibuprofen (ADVIL/MOTRIN) 200 MG tablet  SUMAtriptan (IMITREX) 50 MG tablet      No Known Allergies  Family History  No family history on file.  Social History   Social History     Tobacco Use     Smoking status: Never     Smokeless tobacco: Never   Substance Use Topics     Alcohol use: Never     Drug use: Never      Past medical history, past surgical history, medications, allergies, family history, and social history were reviewed with the patient. No additional pertinent items.      A medically appropriate review of systems was performed with pertinent positives and negatives noted in the HPI, and all other systems negative.    Physical Exam   BP: 131/80  Pulse: 57  Temp: 98.4  F (36.9  C)  Resp: 18  SpO2: 100 %  Physical Exam  Constitutional:       General: He is not in acute distress.     Appearance: He is not ill-appearing or diaphoretic.   HENT:      Head: Atraumatic.   Eyes:      General: No scleral icterus.     Pupils: Pupils are  equal, round, and reactive to light.   Cardiovascular:      Heart sounds: Normal heart sounds.   Pulmonary:      Effort: No respiratory distress.      Breath sounds: Normal breath sounds.   Abdominal:      General: Bowel sounds are normal.      Palpations: Abdomen is soft.      Tenderness: There is no abdominal tenderness.      Comments: Mild tenderness in soft tissue of the low right flank   Musculoskeletal:         General: No tenderness.      Cervical back: No tenderness (no midline tenderness).      Thoracic back: No tenderness (no midline tenderness).      Lumbar back: No tenderness (no midline tenderness).      Comments: Mild tenderness in right paraspinal muscles. Pelvis stable   Skin:     General: Skin is warm.      Findings: No laceration, lesion or rash.             ED Course, Procedures, & Data      Procedures                     No results found for any visits on 03/01/23.  Medications   acetaminophen (TYLENOL) tablet 975 mg (975 mg Oral $Given 3/1/23 1138)   ibuprofen (ADVIL/MOTRIN) tablet 600 mg (600 mg Oral $Given 3/1/23 1138)     Labs Ordered and Resulted from Time of ED Arrival to Time of ED Departure - No data to display  No orders to display          Critical care was not performed.     Medical Decision Making  The patient's presentation was of low complexity (an acute and uncomplicated illness or injury).    The patient's evaluation involved:  history and exam without other MDM data elements    The patient's management necessitated moderate risk (prescription drug management including medications given in the ED).      Assessment & Plan    No signs of acute fracture. More likely contusion given the direct impact. Plan is oral pain medications, Tylenol and Motrin. Discharged with over-the-counter medications and return to primary care doctor if pain does not improve over the next week. Patient agrees with plan in native language. Will discharge.    This part of the medical record was transcribed  by Rosio Sanford, Medical Scribe, from a dictation done by Nain Gómez MD.     I have reviewed the nursing notes. I have reviewed the findings, diagnosis, plan and need for follow up with the patient.    New Prescriptions    ACETAMINOPHEN (TYLENOL) 500 MG TABLET    Take 2 tablets (1,000 mg) by mouth every 8 hours as needed for mild pain    NAPROXEN (NAPROSYN) 500 MG TABLET    Take 1 tablet (500 mg) by mouth 2 times daily (with meals) for 7 days       Final diagnoses:   Contusion of right side of back, initial encounter     I, Rosio Sanford, am serving as a trained medical scribe to document services personally performed by Nain Gómez MD, based on the provider's statements to me.     I, Nain Gómez MD, was physically present and have reviewed and verified the accuracy of this note documented by Rosio Sanford.    Nain Gómez MD  [facility] EMERGENCY DEPARTMENT  3/1/2023     Nain Gómez MD  03/01/23 1748

## 2023-03-01 NOTE — DISCHARGE INSTRUCTIONS
For pain, please take 975-1000mg acetaminophen (tylenol) every 8 hours -- do not take more than 3000mg in a 24 hour period.    You can also take 600mg ibuprofen (motrin/advil) every 6 hours for pain  Please make an appointment to follow up with [facility] (phone: [phone]) and [facility] (phone: [phone]) as soon as possible if not improving.

## 2023-03-01 NOTE — ED TRIAGE NOTES
Patient reports that last Monday he was dizzy and fell. Patient now has right lower back pain.

## 2023-03-01 NOTE — ED TRIAGE NOTES
Triage Assessment     Row Name 03/01/23 1106       Triage Assessment (Adult)    Airway WDL WDL       Respiratory WDL    Respiratory WDL WDL       Skin Circulation/Temperature WDL    Skin Circulation/Temperature WDL WDL       Cardiac WDL    Cardiac WDL WDL       Peripheral/Neurovascular WDL    Peripheral Neurovascular WDL WDL       Cognitive/Neuro/Behavioral WDL    Cognitive/Neuro/Behavioral WDL WDL

## 2024-02-05 ENCOUNTER — APPOINTMENT (OUTPATIENT)
Dept: ULTRASOUND IMAGING | Facility: CLINIC | Age: 39
End: 2024-02-05
Attending: EMERGENCY MEDICINE
Payer: COMMERCIAL

## 2024-02-05 ENCOUNTER — TELEPHONE (OUTPATIENT)
Dept: FAMILY MEDICINE | Facility: CLINIC | Age: 39
End: 2024-02-05

## 2024-02-05 ENCOUNTER — APPOINTMENT (OUTPATIENT)
Dept: GENERAL RADIOLOGY | Facility: CLINIC | Age: 39
End: 2024-02-05
Attending: EMERGENCY MEDICINE
Payer: COMMERCIAL

## 2024-02-05 ENCOUNTER — HOSPITAL ENCOUNTER (EMERGENCY)
Facility: CLINIC | Age: 39
Discharge: HOME | End: 2024-02-05
Attending: EMERGENCY MEDICINE | Admitting: EMERGENCY MEDICINE
Payer: COMMERCIAL

## 2024-02-05 VITALS
BODY MASS INDEX: 23.16 KG/M2 | HEART RATE: 54 BPM | TEMPERATURE: 98 F | WEIGHT: 139 LBS | DIASTOLIC BLOOD PRESSURE: 64 MMHG | SYSTOLIC BLOOD PRESSURE: 112 MMHG | RESPIRATION RATE: 16 BRPM | OXYGEN SATURATION: 100 %

## 2024-02-05 DIAGNOSIS — M25.511 ACUTE PAIN OF RIGHT SHOULDER: ICD-10-CM

## 2024-02-05 DIAGNOSIS — Z75.8 DOES NOT HAVE PRIMARY CARE PROVIDER: ICD-10-CM

## 2024-02-05 LAB
ALBUMIN SERPL BCG-MCNC: 4.2 G/DL (ref 3.5–5.2)
ALP SERPL-CCNC: 70 U/L (ref 40–150)
ALT SERPL W P-5'-P-CCNC: 32 U/L (ref 0–70)
ANION GAP SERPL CALCULATED.3IONS-SCNC: 11 MMOL/L (ref 7–15)
AST SERPL W P-5'-P-CCNC: 23 U/L (ref 0–45)
BASOPHILS # BLD AUTO: 0 10E3/UL (ref 0–0.2)
BASOPHILS NFR BLD AUTO: 1 %
BILIRUB SERPL-MCNC: 0.3 MG/DL
BUN SERPL-MCNC: 13 MG/DL (ref 6–20)
CALCIUM SERPL-MCNC: 8.8 MG/DL (ref 8.6–10)
CHLORIDE SERPL-SCNC: 106 MMOL/L (ref 98–107)
CREAT SERPL-MCNC: 0.64 MG/DL (ref 0.67–1.17)
DEPRECATED HCO3 PLAS-SCNC: 23 MMOL/L (ref 22–29)
EGFRCR SERPLBLD CKD-EPI 2021: >90 ML/MIN/1.73M2
EOSINOPHIL # BLD AUTO: 0.1 10E3/UL (ref 0–0.7)
EOSINOPHIL NFR BLD AUTO: 3 %
ERYTHROCYTE [DISTWIDTH] IN BLOOD BY AUTOMATED COUNT: 11.9 % (ref 10–15)
GLUCOSE SERPL-MCNC: 101 MG/DL (ref 70–99)
HCT VFR BLD AUTO: 44.4 % (ref 40–53)
HGB BLD-MCNC: 14.6 G/DL (ref 13.3–17.7)
IMM GRANULOCYTES # BLD: 0 10E3/UL
IMM GRANULOCYTES NFR BLD: 0 %
LIPASE SERPL-CCNC: 23 U/L (ref 13–60)
LYMPHOCYTES # BLD AUTO: 1.3 10E3/UL (ref 0.8–5.3)
LYMPHOCYTES NFR BLD AUTO: 32 %
MCH RBC QN AUTO: 29.3 PG (ref 26.5–33)
MCHC RBC AUTO-ENTMCNC: 32.9 G/DL (ref 31.5–36.5)
MCV RBC AUTO: 89 FL (ref 78–100)
MONOCYTES # BLD AUTO: 0.3 10E3/UL (ref 0–1.3)
MONOCYTES NFR BLD AUTO: 6 %
NEUTROPHILS # BLD AUTO: 2.4 10E3/UL (ref 1.6–8.3)
NEUTROPHILS NFR BLD AUTO: 58 %
NRBC # BLD AUTO: 0 10E3/UL
NRBC BLD AUTO-RTO: 0 /100
PLATELET # BLD AUTO: 201 10E3/UL (ref 150–450)
POTASSIUM SERPL-SCNC: 4 MMOL/L (ref 3.4–5.3)
PROT SERPL-MCNC: 7.1 G/DL (ref 6.4–8.3)
RBC # BLD AUTO: 4.98 10E6/UL (ref 4.4–5.9)
SODIUM SERPL-SCNC: 140 MMOL/L (ref 135–145)
WBC # BLD AUTO: 4.2 10E3/UL (ref 4–11)

## 2024-02-05 PROCEDURE — 85025 COMPLETE CBC W/AUTO DIFF WBC: CPT | Performed by: EMERGENCY MEDICINE

## 2024-02-05 PROCEDURE — 96374 THER/PROPH/DIAG INJ IV PUSH: CPT | Performed by: EMERGENCY MEDICINE

## 2024-02-05 PROCEDURE — 93010 ELECTROCARDIOGRAM REPORT: CPT | Performed by: EMERGENCY MEDICINE

## 2024-02-05 PROCEDURE — 250N000011 HC RX IP 250 OP 636: Performed by: EMERGENCY MEDICINE

## 2024-02-05 PROCEDURE — 83690 ASSAY OF LIPASE: CPT | Performed by: EMERGENCY MEDICINE

## 2024-02-05 PROCEDURE — 76705 ECHO EXAM OF ABDOMEN: CPT

## 2024-02-05 PROCEDURE — 71046 X-RAY EXAM CHEST 2 VIEWS: CPT

## 2024-02-05 PROCEDURE — 93005 ELECTROCARDIOGRAM TRACING: CPT | Performed by: EMERGENCY MEDICINE

## 2024-02-05 PROCEDURE — 99284 EMERGENCY DEPT VISIT MOD MDM: CPT | Mod: 25 | Performed by: EMERGENCY MEDICINE

## 2024-02-05 PROCEDURE — 73030 X-RAY EXAM OF SHOULDER: CPT | Mod: RT

## 2024-02-05 PROCEDURE — 82040 ASSAY OF SERUM ALBUMIN: CPT | Performed by: EMERGENCY MEDICINE

## 2024-02-05 PROCEDURE — 99285 EMERGENCY DEPT VISIT HI MDM: CPT | Mod: 25 | Performed by: EMERGENCY MEDICINE

## 2024-02-05 PROCEDURE — 36415 COLL VENOUS BLD VENIPUNCTURE: CPT | Performed by: EMERGENCY MEDICINE

## 2024-02-05 RX ORDER — KETOROLAC TROMETHAMINE 15 MG/ML
15 INJECTION, SOLUTION INTRAMUSCULAR; INTRAVENOUS ONCE
Status: COMPLETED | OUTPATIENT
Start: 2024-02-05 | End: 2024-02-05

## 2024-02-05 RX ORDER — IBUPROFEN 200 MG
600 TABLET ORAL 3 TIMES DAILY
Qty: 45 TABLET | Refills: 0 | Status: SHIPPED | OUTPATIENT
Start: 2024-02-05 | End: 2024-02-10

## 2024-02-05 RX ADMIN — KETOROLAC TROMETHAMINE 15 MG: 15 INJECTION, SOLUTION INTRAMUSCULAR; INTRAVENOUS at 08:38

## 2024-02-05 ASSESSMENT — ACTIVITIES OF DAILY LIVING (ADL): ADLS_ACUITY_SCORE: 35

## 2024-02-05 NOTE — Clinical Note
Stacy Castillo was seen and treated in our emergency department on 2/5/2024.  He may return to work on 02/06/2024.  Patient will have no use of his right arm until 2/9/2024 at which point he may use his arm but should not lift more than 10 pounds with his right arm until 2/13/2024.     If you have any questions or concerns, please don't hesitate to call.      Otf Gutierrez MD

## 2024-02-05 NOTE — DISCHARGE INSTRUCTIONS
Wear the sling on your right arm for 3 days and then you may remove.  Start range of motion exercises of your right shoulder on day 2.    Fill your prescription and take as directed    Do not use your right arm for 2 days and let it rest.  Then you may use your right arm but should not lift anything more than 10 pounds with your right arm for 1 week.    A referral to our orthopedic clinic has been made in the computer system.  They should call you in the next 24 hours to help you set up an appointment to be seen sometime in the next week.  If they do not call you in the next 24 hours, please call them at [facility] (phone: [phone]).

## 2024-02-05 NOTE — ED PROVIDER NOTES
[facility] EMERGENCY DEPARTMENT ([facility])  2/05/24  ED 07      History     Chief Complaint   Patient presents with    Shoulder Pain     Right shoulder pain.   Sudden onset, no known injury.  Radiates down arm.  Denies numbness or tingling.  Started at 6 am     HPI  Stacy Castillo is a 39 year old male who presents to the emergency department complaining of some right shoulder pain.  Patient states that he woke up this morning and went to work and when he got to work his right shoulder started to hurt spontaneously.  Patient states he has never had this happen before and states that he was not using his right arm when this occurred nor was there any trauma.  Patient states the pain does increase when he moves his right arm and he came to the ER for evaluation.  Patient denies any diaphoresis, nausea, shortness of breath and denies any fevers.  Patient denies any neck pain denies any abdominal pain.    This part of the medical record was transcribed by HANNAH HART Medical Scribe, from a dictation done by Otf Gutierrez MD.     Past Medical History  Past Medical History:   Diagnosis Date    Chronic midline low back pain 2022     History reviewed. No pertinent surgical history.    amitriptyline (ELAVIL) 10 MG tablet  ibuprofen (ADVIL/MOTRIN) 200 MG tablet  SUMAtriptan (IMITREX) 50 MG tablet      No Known Allergies    Family History  History reviewed. No pertinent family history.    Social History   Social History     Tobacco Use    Smoking status: Never    Smokeless tobacco: Never   Substance Use Topics    Alcohol use: Never    Drug use: Never      Past medical history, past surgical history, medications, allergies, family history, and social history were reviewed with the patient. No additional pertinent items.      A complete review of systems was performed with pertinent positives and negatives noted in the HPI, and all other systems negative.    Physical Exam   BP: 117/71  Pulse:  57  Resp: 16  Weight: 63 kg (139 lb)  SpO2: 100 %  Physical Exam  Vitals and nursing note reviewed.   Constitutional:       Appearance: He is not ill-appearing or diaphoretic.   HENT:      Head: Atraumatic.   Eyes:      Extraocular Movements: Extraocular movements intact.      Pupils: Pupils are equal, round, and reactive to light.   Cardiovascular:      Rate and Rhythm: Regular rhythm.      Heart sounds: Normal heart sounds.   Pulmonary:      Breath sounds: Normal breath sounds.   Chest:      Chest wall: No tenderness.   Abdominal:      Comments: Patient's abdomen is soft but there is a suggestion of some right upper quadrant tenderness to deep palpation.  This may indicate biliary colic precipitating shoulder pain.   Musculoskeletal:      Cervical back: Normal range of motion and neck supple.      Comments: Patient's right shoulder has good passive range of motion and has minimal tenderness by palpation with tenderness over the anterior aspect of the shoulder without definite tendon tenderness palpated   Neurological:      General: No focal deficit present.      Mental Status: He is alert and oriented to person, place, and time.   Psychiatric:         Mood and Affect: Mood normal.           ED Course, Procedures, & Data      Procedures       EKG reveals a normal sinus rhythm at a rate of 62 with a KS interval of 0.158 and a QRS duration of 0.106 significant for a nonspecific interventricular conduction delay.  Patient had a normal axis with no acute ST or T wave changes significant for ischemia.  This is read by me personally.       Results for orders placed or performed during the hospital encounter of 02/05/24   XR Chest 2 Views     Status: None    Narrative    CHEST TWO VIEWS  2/5/2024 9:11 AM     HISTORY:  Chest pain.    COMPARISON: None.      Impression    IMPRESSION: Negative chest. Lungs clear.    ROBERT TOBIN MD         SYSTEM ID:  [id number]   US Abdomen Limited     Status: None (Preliminary result)     Narrative    US ABDOMEN LIMITED 2/5/2024 8:55 AM    CLINICAL HISTORY: Right upper quadrant pain.    TECHNIQUE: Limited abdominal ultrasound.    COMPARISON: None.    FINDINGS:    GALLBLADDER: The gallbladder is normal. No gallstones, wall  thickening, or pericholecystic fluid. Negative sonographic Spivey's  sign.    BILE DUCTS: There is no biliary dilatation. The common duct measures 2  mm.    LIVER: Unremarkable where seen.    RIGHT KIDNEY: No hydronephrosis.    PANCREAS: The visualized portions of the pancreas are normal.    No ascites.      Impression    IMPRESSION: No acute process demonstrated.    XR Shoulder Right G/E 3 Views     Status: None    Narrative    XR SHOULDER RIGHT G/E 3 VIEWS 2/5/2024 9:11 AM     HISTORY: pain    COMPARISON: None.         Impression    IMPRESSION: The right glenohumeral and acromioclavicular joints are  negative for fracture or dislocation.    JENNY ACOSTA MD         SYSTEM ID:  [id number]   Comprehensive metabolic panel     Status: Abnormal   Result Value Ref Range    Sodium 140 135 - 145 mmol/L    Potassium 4.0 3.4 - 5.3 mmol/L    Carbon Dioxide (CO2) 23 22 - 29 mmol/L    Anion Gap 11 7 - 15 mmol/L    Urea Nitrogen 13.0 6.0 - 20.0 mg/dL    Creatinine 0.64 (L) 0.67 - 1.17 mg/dL    GFR Estimate >90 >60 mL/min/1.73m2    Calcium 8.8 8.6 - 10.0 mg/dL    Chloride 106 98 - 107 mmol/L    Glucose 101 (H) 70 - 99 mg/dL    Alkaline Phosphatase 70 40 - 150 U/L    AST 23 0 - 45 U/L    ALT 32 0 - 70 U/L    Protein Total 7.1 6.4 - 8.3 g/dL    Albumin 4.2 3.5 - 5.2 g/dL    Bilirubin Total 0.3 <=1.2 mg/dL   Lipase     Status: Normal   Result Value Ref Range    Lipase 23 13 - 60 U/L   CBC with platelets and differential     Status: None   Result Value Ref Range    WBC Count 4.2 4.0 - 11.0 10e3/uL    RBC Count 4.98 4.40 - 5.90 10e6/uL    Hemoglobin 14.6 13.3 - 17.7 g/dL    Hematocrit 44.4 40.0 - 53.0 %    MCV 89 78 - 100 fL    MCH 29.3 26.5 - 33.0 pg    MCHC 32.9 31.5 - 36.5 g/dL    RDW 11.9 10.0 -  15.0 %    Platelet Count 201 150 - 450 10e3/uL    % Neutrophils 58 %    % Lymphocytes 32 %    % Monocytes 6 %    % Eosinophils 3 %    % Basophils 1 %    % Immature Granulocytes 0 %    NRBCs per 100 WBC 0 <1 /100    Absolute Neutrophils 2.4 1.6 - 8.3 10e3/uL    Absolute Lymphocytes 1.3 0.8 - 5.3 10e3/uL    Absolute Monocytes 0.3 0.0 - 1.3 10e3/uL    Absolute Eosinophils 0.1 0.0 - 0.7 10e3/uL    Absolute Basophils 0.0 0.0 - 0.2 10e3/uL    Absolute Immature Granulocytes 0.0 <=0.4 10e3/uL    Absolute NRBCs 0.0 10e3/uL   CBC with platelets differential     Status: None    Narrative    The following orders were created for panel order CBC with platelets differential.  Procedure                               Abnormality         Status                     ---------                               -----------         ------                     CBC with platelets and d...[[id number]]                      Final result                 Please view results for these tests on the individual orders.     Medications   ketorolac (TORADOL) injection 15 mg (15 mg Intravenous $Given 2/5/24 0832)         Critical care was not performed.     Medical Decision Making  The patient's presentation was of moderate complexity (an undiagnosed new problem with uncertain diagnosis).    The patient's evaluation involved:  ordering and/or review of 3+ test(s) in this encounter (see above)    The patient's management necessitated moderate risk (prescription drug management including medications given in the ED).      Orders Placed This Encounter   Procedures    ARM SLING L    XR Chest 2 Views    US Abdomen Limited    XR Shoulder Right G/E 3 Views    Comprehensive metabolic panel    Lipase    CBC with platelets and differential    Orthopedic  Referral    Primary Care Referral    EKG 12 lead    Peripheral IV: Standard    CBC with platelets differential         Assessment & Plan      I have reviewed the nursing notes. I have reviewed the findings,  diagnosis, plan and need for follow up with the patient.       Review of your medicines        UNREVIEWED medicines. Ask your doctor about these medicines        Dose / Directions   amitriptyline 10 MG tablet  Commonly known as: ELAVIL  Used for: Migraine without aura and without status migrainosus, not intractable      Dose: 10 mg  Take 1 tablet (10 mg) by mouth At Bedtime  Quantity: 30 tablet  Refills: 11     SUMAtriptan 50 MG tablet  Commonly known as: IMITREX  Used for: Migraine without aura and without status migrainosus, not intractable      Dose: 50 mg  Take 1 tablet (50 mg) by mouth at onset of headache for migraine , may repeat dose in 2 hours if needed, max 200 mg doses per 24 hours  Quantity: 9 tablet  Refills: 11            CONTINUE these medicines which may have CHANGED, or have new prescriptions. If we are uncertain of the size of tablets/capsules you have at home, strength may be listed as something that might have changed.        Dose / Directions   ibuprofen 200 MG tablet  Commonly known as: ADVIL/MOTRIN  This may have changed:   when to take this  reasons to take this      Dose: 600 mg  Take 3 tablets (600 mg) by mouth 3 times daily for 5 days  Quantity: 45 tablet  Refills: 0               Where to get your medicines        Some of these will need a paper prescription and others can be bought over the counter. Ask your nurse if you have questions.    Bring a paper prescription for each of these medications  ibuprofen 200 MG tablet         Final diagnoses:   Acute pain of right shoulder   Does not have primary care provider     Wear the sling on your right arm for 3 days and then you may remove.  Start range of motion exercises of your right shoulder on day 2.    Fill your prescription and take as directed    Do not use your right arm for 2 days and let it rest.  Then you may use your right arm but should not lift anything more than 10 pounds with your right arm for 1 week.  Work note given    A  referral to our orthopedic clinic has been made in the computer system.  They should call you in the next 24 hours to help you set up an appointment to be seen sometime in the next week.  If they do not call you in the next 24 hours, please call them at [facility] (phone: [phone]).    Routine discharge instructions were given for range of motion exercises.      Otf Gutierrez MD  [facility] EMERGENCY DEPARTMENT  2/5/2024     Otf Gutierrez MD  02/05/24 1024

## 2024-02-05 NOTE — ED TRIAGE NOTES
Triage Assessment (Adult)       Row Name 02/05/24 0759          Triage Assessment    Airway WDL WDL        Respiratory WDL    Respiratory WDL WDL        Skin Circulation/Temperature WDL    Skin Circulation/Temperature WDL WDL        Cardiac WDL    Cardiac WDL WDL        Peripheral/Neurovascular WDL    Peripheral Neurovascular WDL WDL        Cognitive/Neuro/Behavioral WDL    Cognitive/Neuro/Behavioral WDL WDL

## 2024-02-05 NOTE — TELEPHONE ENCOUNTER
Calling to schedule ED follow-up and est care appt  Scheduled 2/19 with Xuan GUERRA RN  [facility]

## 2024-02-06 NOTE — TELEPHONE ENCOUNTER
DIAGNOSIS: right shoulder pain of unclear etiology/ XR 2/5/24 / Dr Gutierrez/ [employer]/ ortho con    Please use iPad or phones [phone] to reach  and follow prompts     APPOINTMENT DATE: 2.7.24   NOTES STATUS DETAILS   DISCHARGE REPORT from the ER Internal 2.5.24  Matt  [facility]   MEDICATION LIST Internal    XRAYS (IMAGES & REPORTS) Internal 2.5.24  XR Shoulder Right

## 2024-02-07 ENCOUNTER — PRE VISIT (OUTPATIENT)
Dept: ORTHOPEDICS | Facility: CLINIC | Age: 39
End: 2024-02-07

## 2024-02-13 LAB
ATRIAL RATE - MUSE: 54 BPM
DIASTOLIC BLOOD PRESSURE - MUSE: NORMAL MMHG
INTERPRETATION ECG - MUSE: NORMAL
P AXIS - MUSE: 61 DEGREES
PR INTERVAL - MUSE: 154 MS
QRS DURATION - MUSE: 100 MS
QT - MUSE: 428 MS
QTC - MUSE: 405 MS
R AXIS - MUSE: 34 DEGREES
SYSTOLIC BLOOD PRESSURE - MUSE: NORMAL MMHG
T AXIS - MUSE: 37 DEGREES
VENTRICULAR RATE- MUSE: 54 BPM

## 2024-02-14 ENCOUNTER — OFFICE VISIT (OUTPATIENT)
Dept: ORTHOPEDICS | Facility: CLINIC | Age: 39
End: 2024-02-14
Attending: EMERGENCY MEDICINE
Payer: COMMERCIAL

## 2024-02-14 DIAGNOSIS — M75.21 BICEPS TENDINITIS OF RIGHT UPPER EXTREMITY: Primary | ICD-10-CM

## 2024-02-14 DIAGNOSIS — M25.511 ACUTE PAIN OF RIGHT SHOULDER: ICD-10-CM

## 2024-02-14 PROCEDURE — 99203 OFFICE O/P NEW LOW 30 MIN: CPT | Performed by: FAMILY MEDICINE

## 2024-02-14 NOTE — LETTER
2/14/2024      RE: Stacy Castillo  [address]  [facility] [address]     Dear Colleague,    Thank you for referring your patient, Stacy Castillo, to the [facility] SPORTS MEDICINE CLINIC [place]. Please see a copy of my visit note below.    Sports Medicine Clinic Visit    PCP: No Ref-Primary, Physician    Stacy Castillo is a 39 year old male who is seen  in consultation at the request of Dr. Gutierrez presenting with right shoulder pain.    Injury: No ANTWAN     Location of Pain: right shoulder; anterior   Duration of Pain: 2 week(s)  Rating of Pain: 4/10  Pain is better with: Nothing  Pain is worse with: Painful at night   Additional Features: None   Treatment so far consists of: Ibuprofen   Prior History of related problems: No     There were no vitals taken for this visit.         Patient evaluated in the emergency room 2/5/2024 with the onset of right-sided shoulder discomfort that occurred that morning.  Patient could think of no acute injury or acute stress to the shoulder.  Noticed pain with lifting and reaching with his right arm.  No neck pain or radicular numbness or tingling.  In the emergency room an EKG, chest x-ray, shoulder x-ray and right upper quadrant ultrasound were noncontributory.  Normal lipase, liver function tests, white blood cell count, hemoglobin.  BUN 13 creatinine 0.64.  Patient was given a Toradol injection and discharged with an arm sling.  He was told to continue with ibuprofen and follow-up in clinic.  Work note from the emergency room was provided restricting him to not lifting more than 10 pounds with his right arm for a week.    For the past 2 years this patient has had a job with repetitive reaching and lifting.  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  He will have to move repetitively from the left side of his body over to the  right side with some reaching and motions above the level of his waist.  These will be the motions that he does for 12 hours a day.    He points to the anterior aspect of his right shoulder as the area that has been uncomfortable.  He was given a work restriction for a week without any use of his right shoulder from the emergency room, and indicates that he has had some improvement in right shoulder pain with this amount of rest.    Patient history with the help of a phone .        Imaging study below reviewed by me:  XR SHOULDER RIGHT G/E 3 VIEWS 2/5/2024 9:11 AM      HISTORY: pain     COMPARISON: None.                                                                          IMPRESSION: The right glenohumeral and acromioclavicular joints are  negative for fracture or dislocation.     JENNY ACOSTA MD         CERVICAL SPINE TWO TO THREE VIEWS  1/21/2022 4:52 PM      COMPARISON: None.     HISTORY: Neck pain.                                                                      IMPRESSION: There is normal alignment of the cervical vertebrae.  Vertebral body heights of the cervical spine are normal.  Craniocervical alignment is normal. There are no fractures of the  cervical spine.     TOBIAS JENSEN MD      PMH:  Past Medical History:   Diagnosis Date     Chronic midline low back pain 2022       Active problem list:  Patient Active Problem List   Diagnosis     Migraine without aura and without status migrainosus, not intractable       FH:  No family history on file.    SH:  Social History     Socioeconomic History     Marital status: Single     Spouse name: Not on file     Number of children: Not on file     Years of education: Not on file     Highest education level: Not on file   Occupational History     Not on file   Tobacco Use     Smoking status: Never     Smokeless tobacco: Never   Substance and Sexual Activity     Alcohol use: Never     Drug use: Never     Sexual activity: Not on file   Other Topics  Concern     Not on file   Social History Narrative     Not on file     Social Determinants of Health     Financial Resource Strain: Not on file   Food Insecurity: Not on file   Transportation Needs: Not on file   Physical Activity: Not on file   Stress: Not on file   Social Connections: Not on file   Interpersonal Safety: Not on file   Housing Stability: Not on file       MEDS:  See EMR, reviewed  amitriptyline (ELAVIL) 10 MG tablet  ibuprofen (ADVIL/MOTRIN) 200 MG tablet  SUMAtriptan (IMITREX) 50 MG tablet      ALL:  See EMR, reviewed\    REVIEW OF SYSTEMS:  CONSTITUTIONAL:NEGATIVE for fever, chills, change in weight  INTEGUMENTARY/SKIN: NEGATIVE for worrisome rashes, moles or lesions  EYES: NEGATIVE for vision changes or irritation  ENT/MOUTH: NEGATIVE for ear, mouth and throat problems  RESP:NEGATIVE for significant cough or SOB  BREAST: NEGATIVE for masses, tenderness or discharge  CV: NEGATIVE for chest pain, palpitations or peripheral edema  GI: NEGATIVE for nausea, abdominal pain, heartburn, or change in bowel habits  :NEGATIVE for frequency, dysuria, or hematuria  :NEGATIVE for frequency, dysuria, or hematuria  NEURO: NEGATIVE for weakness, dizziness or paresthesias  ENDOCRINE: NEGATIVE for temperature intolerance, skin/hair changes  HEME/ALLERGY/IMMUNE: NEGATIVE for bleeding problems  PSYCHIATRIC: NEGATIVE for changes in mood or affect      Objective: He has full active and passive range of motion of the right shoulder but overhead impingement signs are positive.  Nontender over the AC joint or the anterior cuff.  Tender over the biceps tendon.  Yergason's maneuver is negative speeds maneuver is positive.  Bicep strength is strong to supination and elbow flexion.  No scapular winging.  No effusion at the right shoulder.  Overlying skin is intact.  5 out of 5 strength bilaterally at deltoid, supraspinatus, infraspinatus and subscapularis.  Appropriate conversation and affect.    I personally viewed with  the patient the previous results of his x-ray of the shoulder.    Assessment: Biceps tendinitis right shoulder    Plan: Patient was given a work restriction for the next 3 weeks.  We discussed this work restriction in detail and the patient agreed.  He will see physical therapy at [facility] in the meantime for a posterior shoulder protocol and some modalities.  We discussed the option of a ultrasound-guided biceps tendon sheath injection in the future if not improved with these conservative cares.                              Again, thank you for allowing me to participate in the care of your patient.      Sincerely,    Willie Talavera MD

## 2024-02-14 NOTE — LETTER
[facility] SPORTS MEDICINE CLINIC [address]  4TH FLOOR  [facility] [address]  [phone]        February 14, 2024    Regarding:  Stacy ARRIAGA Anna  [address]  [facility] [address]              To Whom It May Concern;    This patient was seen in sports medicine clinic today with right-sided shoulder pain due to biceps tendinitis, aggravated by his repetitive reaching and lifting at work, with pain for the last 2 weeks.  For the next 3 weeks, from 2/14/2024 to 3/6/2024, he will not use his right upper extremity at work for lifting anything greater than 10 pounds, and will keep his range of motion limited to the level of a tabletop.  He will avoid repetitive overhead reaching or an outstretched right arm.  He will see physical therapy for his right-sided biceps tendinitis in the meantime.          Sincerely,        Willie Talavera MD

## 2024-02-14 NOTE — PATIENT INSTRUCTIONS
[facility] Outpatient Physical Therapy Locations    To schedule an appointment please call our scheduling department at [phone]    To fax a referral to be scheduled fax to [phone]    [place]: [address], [facility]: [address] [facility]: [address], [facility]: [address], [facility]: [address], [facility]: [address] [facility]: [address] [place]  [place]: [address], [facility]: [address], [facility]: [address] [facility]: [address], [facility]: [address], [facility]: [address], [facility]: [address], [facility]: [address] [facility]: [address] [facility] [address], [facility]: [facility]: [address] [facility], [address], [facility]: [address], [facility]: [address], [place], [place]  [place]: [address] [facility]: [address] [place] [address]: [address], [facility]: [address] [facility] [address], [place]  [place]: [address], [facility]: [address], [facility]: [address] [facility] [place], [place]  [facility]: [address], [place]  [facility]: [address] [place] [address]  [place]: [address] [facility] [facility], [address], [facility] [address] [facility],  [facility]: [address] [facility] [place], [place]

## 2024-02-14 NOTE — PROGRESS NOTES
Sports Medicine Clinic Visit    PCP: No Ref-Primary, Physician    Stacy ARRIAGA Anna is a 39 year old male who is seen  in consultation at the request of Dr. Gutierrez presenting with right shoulder pain.    Injury: No ANTWAN     Location of Pain: right shoulder; anterior   Duration of Pain: 2 week(s)  Rating of Pain: 4/10  Pain is better with: Nothing  Pain is worse with: Painful at night   Additional Features: None   Treatment so far consists of: Ibuprofen   Prior History of related problems: No     There were no vitals taken for this visit.         Patient evaluated in the emergency room 2/5/2024 with the onset of right-sided shoulder discomfort that occurred that morning.  Patient could think of no acute injury or acute stress to the shoulder.  Noticed pain with lifting and reaching with his right arm.  No neck pain or radicular numbness or tingling.  In the emergency room an EKG, chest x-ray, shoulder x-ray and right upper quadrant ultrasound were noncontributory.  Normal lipase, liver function tests, white blood cell count, hemoglobin.  BUN 13 creatinine 0.64.  Patient was given a Toradol injection and discharged with an arm sling.  He was told to continue with ibuprofen and follow-up in clinic.  Work note from the emergency room was provided restricting him to not lifting more than 10 pounds with his right arm for a week.    For the past 2 years this patient has had a job with repetitive reaching and lifting.  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  He will have to move repetitively from the left side of his body over to the right side with some reaching and motions above the level of his waist.  These will be the motions that he does for 12 hours a day.    He points to the anterior aspect of his right shoulder as the area that has been uncomfortable.  He was given a work restriction for a week  without any use of his right shoulder from the emergency room, and indicates that he has had some improvement in right shoulder pain with this amount of rest.    Patient history with the help of a phone .        Imaging study below reviewed by me:  XR SHOULDER RIGHT G/E 3 VIEWS 2/5/2024 9:11 AM      HISTORY: pain     COMPARISON: None.                                                                          IMPRESSION: The right glenohumeral and acromioclavicular joints are  negative for fracture or dislocation.     JENNY ACOSTA MD         CERVICAL SPINE TWO TO THREE VIEWS  1/21/2022 4:52 PM      COMPARISON: None.     HISTORY: Neck pain.                                                                      IMPRESSION: There is normal alignment of the cervical vertebrae.  Vertebral body heights of the cervical spine are normal.  Craniocervical alignment is normal. There are no fractures of the  cervical spine.     TOBIAS JENSEN MD      PMH:  Past Medical History:   Diagnosis Date    Chronic midline low back pain 2022       Active problem list:  Patient Active Problem List   Diagnosis    Migraine without aura and without status migrainosus, not intractable       FH:  No family history on file.    SH:  Social History     Socioeconomic History    Marital status: Single     Spouse name: Not on file    Number of children: Not on file    Years of education: Not on file    Highest education level: Not on file   Occupational History    Not on file   Tobacco Use    Smoking status: Never    Smokeless tobacco: Never   Substance and Sexual Activity    Alcohol use: Never    Drug use: Never    Sexual activity: Not on file   Other Topics Concern    Not on file   Social History Narrative    Not on file     Social Determinants of Health     Financial Resource Strain: Not on file   Food Insecurity: Not on file   Transportation Needs: Not on file   Physical Activity: Not on file   Stress: Not on file   Social Connections:  Not on file   Interpersonal Safety: Not on file   Housing Stability: Not on file       MEDS:  See EMR, reviewed  amitriptyline (ELAVIL) 10 MG tablet  ibuprofen (ADVIL/MOTRIN) 200 MG tablet  SUMAtriptan (IMITREX) 50 MG tablet      ALL:  See EMR, reviewed\  NKDA    REVIEW OF SYSTEMS:  CONSTITUTIONAL:NEGATIVE for fever, chills, change in weight  INTEGUMENTARY/SKIN: NEGATIVE for worrisome rashes, moles or lesions  EYES: NEGATIVE for vision changes or irritation  ENT/MOUTH: NEGATIVE for ear, mouth and throat problems  RESP:NEGATIVE for significant cough or SOB  BREAST: NEGATIVE for masses, tenderness or discharge  CV: NEGATIVE for chest pain, palpitations or peripheral edema  GI: NEGATIVE for nausea, abdominal pain, heartburn, or change in bowel habits  :NEGATIVE for frequency, dysuria, or hematuria  :NEGATIVE for frequency, dysuria, or hematuria  NEURO: NEGATIVE for weakness, dizziness or paresthesias  ENDOCRINE: NEGATIVE for temperature intolerance, skin/hair changes  HEME/ALLERGY/IMMUNE: NEGATIVE for bleeding problems  PSYCHIATRIC: NEGATIVE for changes in mood or affect      Objective: He has full active and passive range of motion of the right shoulder but overhead impingement signs are positive.  Nontender over the AC joint or the anterior cuff.  Tender over the biceps tendon.  Yergason's maneuver is negative speeds maneuver is positive.  Bicep strength is strong to supination and elbow flexion.  No scapular winging.  No effusion at the right shoulder.  Overlying skin is intact.  5 out of 5 strength bilaterally at deltoid, supraspinatus, infraspinatus and subscapularis.  Appropriate conversation and affect.    I personally viewed with the patient the previous results of his x-ray of the shoulder.    Assessment: Biceps tendinitis right shoulder    Plan: Patient was given a work restriction for the next 3 weeks.  We discussed this work restriction in detail and the patient agreed.  He will see physical therapy at  [facility] in the meantime for a posterior shoulder protocol and some modalities.  We discussed the option of a ultrasound-guided biceps tendon sheath injection in the future if not improved with these conservative cares.

## 2024-02-28 ENCOUNTER — OFFICE VISIT (OUTPATIENT)
Dept: FAMILY MEDICINE | Facility: CLINIC | Age: 39
End: 2024-02-28
Payer: COMMERCIAL

## 2024-02-28 VITALS
TEMPERATURE: 98.2 F | DIASTOLIC BLOOD PRESSURE: 74 MMHG | HEIGHT: 63 IN | OXYGEN SATURATION: 99 % | SYSTOLIC BLOOD PRESSURE: 118 MMHG | HEART RATE: 65 BPM | WEIGHT: 143 LBS | RESPIRATION RATE: 15 BRPM | BODY MASS INDEX: 25.34 KG/M2

## 2024-02-28 DIAGNOSIS — H57.11 EYE PAIN, RIGHT: Primary | ICD-10-CM

## 2024-02-28 DIAGNOSIS — M75.21 BICEPS TENDONITIS, RIGHT: ICD-10-CM

## 2024-02-28 DIAGNOSIS — G43.009 MIGRAINE WITHOUT AURA AND WITHOUT STATUS MIGRAINOSUS, NOT INTRACTABLE: ICD-10-CM

## 2024-02-28 PROCEDURE — 99213 OFFICE O/P EST LOW 20 MIN: CPT | Performed by: NURSE PRACTITIONER

## 2024-02-28 ASSESSMENT — PAIN SCALES - GENERAL: PAINLEVEL: MILD PAIN (3)

## 2024-02-28 NOTE — PROGRESS NOTES
ICD-10-CM    1. Eye pain, right  H57.11 Adult Eye  Referral      2. Migraine without aura and without status migrainosus, not intractable  G43.009       3. Biceps tendonitis, right  M75.21       Migraine symptoms much improved  Biceps tendonitis managed by Orthopedics; improving  Right eye pain- refer to Ophthalmology to assess.      Jannie Kumar is a 39 year old, presenting for the following health issues:  Hospital F/U    [facility]       Hospital Follow-up Visit:    Hospital/Nursing Home/ Rehab Facility: [facility]  Date of Admission: 2/5/2024  Date of Discharge: 2/5/2024  Reason(s) for Admission: R shoulder pain    Was your hospitalization related to COVID-19? No   Problems taking medications regularly:  None  Medication changes since discharge: None  Problems adhering to non-medication therapy:  None    Summary of hospitalization:  [facility] discharge summary reviewed  Diagnostic Tests/Treatments reviewed.  Follow up needed: He has followed up with Orthopedics; they have prescribed physical therapy, and provided work restrictions; and he will follow up with them if symptoms do not continue to improve.  Other Healthcare Providers Involved in Patient s Care:         Specialist appointment -    Update since discharge: improved. Migraine   Since your last clinic visit, how have your headaches changed?  Improved  How often are you getting headaches or migraines? rare     Are you taking rescue/relief medications? (Select all that apply) not using currently; doesn't need it anymore.   How helpful is your rescue/relief medication?  N/A  Are you taking any medications to prevent migraines? (Select all that apply)  No      Right Eye discomfort x one year. He has noticed increased tear production and pain, and itching. No history of injury to the eye. No pus or drainage from the eye. He has not tried anything for his symptoms.      Plan of care  "communicated with patient       Review of Systems  Constitutional, HEENT, cardiovascular, pulmonary, gi and gu systems are negative, except as otherwise noted.      Objective    /74 (BP Location: Left arm, Patient Position: Sitting, Cuff Size: Adult Regular)   Pulse 65   Temp 98.2  F (36.8  C) (Temporal)   Resp 15   Ht 1.6 m (5' 2.99\")   Wt 64.9 kg (143 lb)   SpO2 99%   BMI 25.34 kg/m    Body mass index is 25.34 kg/m .  Physical Exam   GENERAL: alert and no distress  RESP: lungs clear to auscultation - no rales, rhonchi or wheezes  CV: regular rate and rhythm, normal S1 S2, no S3 or S4, no murmur, click or rub, no peripheral edema  ABDOMEN: soft, nontender, no hepatosplenomegaly, no masses and bowel sounds normal  MS: no gross musculoskeletal defects noted, no edema    Admission on 02/05/2024, Discharged on 02/05/2024   Component Date Value Ref Range Status    Ventricular Rate 02/05/2024 54  BPM Final    Atrial Rate 02/05/2024 54  BPM Final    WV Interval 02/05/2024 154  ms Final    QRS Duration 02/05/2024 100  ms Final    QT 02/05/2024 428  ms Final    QTc 02/05/2024 405  ms Final    P Axis 02/05/2024 61  degrees Final    R AXIS 02/05/2024 34  degrees Final    T Axis 02/05/2024 37  degrees Final    Interpretation ECG 02/05/2024    Final                    Value:Sinus bradycardia  Otherwise normal ECG  Unconfirmed report - interpretation of this ECG is computer generated - see medical record for final interpretation  Confirmed by - EMERGENCY ROOM, PHYSICIAN ([id number]),  JORDAN JENSEN ([id number]) on 2/13/2024 9:00:52 PM      Sodium 02/05/2024 140  135 - 145 mmol/L Final    Reference intervals for this test were updated on 09/26/2023 to more accurately reflect our healthy population. There may be differences in the flagging of prior results with similar values performed with this method. Interpretation of those prior results can be made in the context of the updated reference intervals.     Potassium " 02/05/2024 4.0  3.4 - 5.3 mmol/L Final    Carbon Dioxide (CO2) 02/05/2024 23  22 - 29 mmol/L Final    Anion Gap 02/05/2024 11  7 - 15 mmol/L Final    Urea Nitrogen 02/05/2024 13.0  6.0 - 20.0 mg/dL Final    Creatinine 02/05/2024 0.64 (L)  0.67 - 1.17 mg/dL Final    GFR Estimate 02/05/2024 >90  >60 mL/min/1.73m2 Final    Calcium 02/05/2024 8.8  8.6 - 10.0 mg/dL Final    Chloride 02/05/2024 106  98 - 107 mmol/L Final    Glucose 02/05/2024 101 (H)  70 - 99 mg/dL Final    Alkaline Phosphatase 02/05/2024 70  40 - 150 U/L Final    Reference intervals for this test were updated on 11/14/2023 to more accurately reflect our healthy population. There may be differences in the flagging of prior results with similar values performed with this method. Interpretation of those prior results can be made in the context of the updated reference intervals.    AST 02/05/2024 23  0 - 45 U/L Final    Reference intervals for this test were updated on 6/12/2023 to more accurately reflect our healthy population. There may be differences in the flagging of prior results with similar values performed with this method. Interpretation of those prior results can be made in the context of the updated reference intervals.    ALT 02/05/2024 32  0 - 70 U/L Final    Reference intervals for this test were updated on 6/12/2023 to more accurately reflect our healthy population. There may be differences in the flagging of prior results with similar values performed with this method. Interpretation of those prior results can be made in the context of the updated reference intervals.      Protein Total 02/05/2024 7.1  6.4 - 8.3 g/dL Final    Albumin 02/05/2024 4.2  3.5 - 5.2 g/dL Final    Bilirubin Total 02/05/2024 0.3  <=1.2 mg/dL Final    Lipase 02/05/2024 23  13 - 60 U/L Final    On 02/07/2023 the assay method at [facility] laboratory was changed to the Roche Lipase method on the Carol c6000. Results obtained with different assay  methods or kits cannot be used interchangeably, and therefore, direct comparison to results obtained from this laboratory prior to 02/07/2023 should be interpreted with caution, with each result interpreted in the context of its own reference interval.    WBC Count 02/05/2024 4.2  4.0 - 11.0 10e3/uL Final    RBC Count 02/05/2024 4.98  4.40 - 5.90 10e6/uL Final    Hemoglobin 02/05/2024 14.6  13.3 - 17.7 g/dL Final    Hematocrit 02/05/2024 44.4  40.0 - 53.0 % Final    MCV 02/05/2024 89  78 - 100 fL Final    MCH 02/05/2024 29.3  26.5 - 33.0 pg Final    MCHC 02/05/2024 32.9  31.5 - 36.5 g/dL Final    RDW 02/05/2024 11.9  10.0 - 15.0 % Final    Platelet Count 02/05/2024 201  150 - 450 10e3/uL Final    % Neutrophils 02/05/2024 58  % Final    % Lymphocytes 02/05/2024 32  % Final    % Monocytes 02/05/2024 6  % Final    % Eosinophils 02/05/2024 3  % Final    % Basophils 02/05/2024 1  % Final    % Immature Granulocytes 02/05/2024 0  % Final    NRBCs per 100 WBC 02/05/2024 0  <1 /100 Final    Absolute Neutrophils 02/05/2024 2.4  1.6 - 8.3 10e3/uL Final    Absolute Lymphocytes 02/05/2024 1.3  0.8 - 5.3 10e3/uL Final    Absolute Monocytes 02/05/2024 0.3  0.0 - 1.3 10e3/uL Final    Absolute Eosinophils 02/05/2024 0.1  0.0 - 0.7 10e3/uL Final    Absolute Basophils 02/05/2024 0.0  0.0 - 0.2 10e3/uL Final    Absolute Immature Granulocytes 02/05/2024 0.0  <=0.4 10e3/uL Final    Absolute NRBCs 02/05/2024 0.0  10e3/uL Final           Signed Electronically by: BRYSON Bui CNP

## 2024-03-07 PROBLEM — K21.9 GASTROESOPHAGEAL REFLUX DISEASE, UNSPECIFIED WHETHER ESOPHAGITIS PRESENT: Status: ACTIVE | Noted: 2024-03-07

## 2024-03-12 ENCOUNTER — THERAPY VISIT (OUTPATIENT)
Dept: PHYSICAL THERAPY | Facility: CLINIC | Age: 39
End: 2024-03-12
Attending: FAMILY MEDICINE
Payer: COMMERCIAL

## 2024-03-12 DIAGNOSIS — M75.21 BICEPS TENDINITIS OF RIGHT UPPER EXTREMITY: ICD-10-CM

## 2024-03-12 DIAGNOSIS — M25.511 ACUTE PAIN OF RIGHT SHOULDER: ICD-10-CM

## 2024-03-12 PROCEDURE — 97161 PT EVAL LOW COMPLEX 20 MIN: CPT | Mod: GP | Performed by: PHYSICAL THERAPIST

## 2024-03-12 PROCEDURE — 97110 THERAPEUTIC EXERCISES: CPT | Mod: GP | Performed by: PHYSICAL THERAPIST

## 2024-03-12 NOTE — PROGRESS NOTES
PHYSICAL THERAPY EVALUATION  Type of Visit: Evaluation    See electronic medical record for Abuse and Falls Screening details.    Subjective   Pt is a 39 year old male presenting with complaints of R anterior shoulder pain.   The symptoms started 2/5/24 without ANTWAN. He had some shoulder pain but something that day made it worse. He went to ED that day and after some testing patient was given a Toradol injection and discharged with an arm sling. Work note from the emergency room was provided restricting him to not lifting more than 10 pounds with his right arm for a week.   He does have a job requiring a lot of repeated lifting 12 hours a day  Prior treatments: none  The resulting functional limitations include pushing or lifting, reaching, job tasks, .   Pain is better with OTC meds  Goals of therapy: work with both hands, reaching, lifting        Presenting condition or subjective complaint:    Date of onset:      Relevant medical history:     Dates & types of surgery:      Prior diagnostic imaging/testing results:       Prior therapy history for the same diagnosis, illness or injury:        Prior Level of Function  Transfers: Independent  Ambulation: Independent  ADL: Independent  IADL:     Pain assessment: Pain present     Objective       SHOULDER EVALUATION    PAIN: Pain Level at Rest: 3/10  Pain Level with Use: 4/10  Pain Location: shoulder  Pain Quality: Aching, Sharp, Stabbing, and tight    Posture: rounded shoulders    Scapular retraction: troubles with initial activation. UT compensation    ROM: WNL L, WFL R with slight tightness at end range       STRENGTH:   Pain: - none + mild ++ moderate +++ severe  Strength Scale: 0-5/5 Left Right   Shoulder Flexion 5 4+   Shoulder Extension     Shoulder Abduction 5 4+   Shoulder Adduction     Shoulder Internal Rotation 5 5   Shoulder External Rotation 5 4+   Shoulder Horizontal Abduction     Shoulder Horizontal Adduction     Elbow Flexion     Elbow Extension     Mid  Trap     Lower Trap     Rhomboid     Serratus Anterior         SPECIAL TESTS:    Left Right   Impingement     Neer's  Negative    Hawkin's-Juan  Negative    Coracoid Impingement     Internal impingement     Labral     Anterior Slide     [place]'s     Crank     Instability     Apprehension (anterior)     Relocation (anterior)     Anterior Load & Shift     Posterior Load & Shift     Posterior instability (with 90 degrees flex)     Multi-Directional Instability      Sulcus     Biceps      Speed's  Negative    Rotator Cuff Tear     Drop Arm  Negative    ERLS  Negative    Lift off   Negative      PALPATION: bicep tendon    Assessment & Plan   CLINICAL IMPRESSIONS  Medical Diagnosis:      Treatment Diagnosis:     Impression/Assessment: Patient is a 39 year old male with R shoulder complaints.  The following significant findings have been identified: Pain, Decreased ROM/flexibility, Decreased joint mobility, Decreased strength, Impaired muscle performance, Decreased activity tolerance, and Impaired posture. These impairments interfere with their ability to perform self care tasks, work tasks, recreational activities, and household chores as compared to previous level of function.     Clinical Decision Making (Complexity):  Clinical Presentation: Stable/Uncomplicated  Clinical Presentation Rationale: based on medical and personal factors listed in PT evaluation  Clinical Decision Making (Complexity): Low complexity    PLAN OF CARE  Treatment Interventions:  Interventions: Manual Therapy, Neuromuscular Re-education, Therapeutic Activity, Therapeutic Exercise    Long Term Goals            Frequency of Treatment:    Duration of Treatment:      Recommended Referrals to Other Professionals:   Education Assessment:        Risks and benefits of evaluation/treatment have been explained.   Patient/Family/caregiver agrees with Plan of Care.     Evaluation Time:             Signing Clinician: Yessenia Cross, PT          M  [facility]                                                                                   OUTPATIENT PHYSICAL THERAPY    PLAN OF TREATMENT FOR OUTPATIENT REHABILITATION   Patient's Last Name, First Name, Stacy Uribe YOB: 1985   Provider's Name   [facility]   Medical Record No.  [id number]     Onset Date:   2/5/24 Start of Care Date:  3/12/24     Medical Diagnosis:   Acute pain of right shoulder, Biceps tendinitis of right upper extremity       PT Treatment Diagnosis:   Right shoulder pain  Plan of Treatment  Frequency/Duration: 2x /  month for 90 days     Certification date from 3/12/24   to  6/10/24         See note for plan of treatment details and functional goals     Yessenia Cross, PT                         I CERTIFY THE NEED FOR THESE SERVICES FURNISHED UNDER        THIS PLAN OF TREATMENT AND WHILE UNDER MY CARE     (Physician attestation of this document indicates review and certification of the therapy plan).              Referring Provider:  Willie Talavera    Initial Assessment  See Epic Evaluation-

## 2024-03-13 ENCOUNTER — TELEPHONE (OUTPATIENT)
Dept: ORTHOPEDICS | Facility: CLINIC | Age: 39
End: 2024-03-13
Payer: MEDICAID

## 2024-03-13 NOTE — TELEPHONE ENCOUNTER
Spoke to Stacy and he requested an additional two weeks added to his work restrictions. I let him know that this could be done. He provided his email ([email or web address]) and requested it be sent there. The letter was written and sent. He had no further questions.        Yoel Payan M.A., LAT, ATC  Certified Athletic Trainer

## 2024-03-13 NOTE — LETTER
[facility] [address]  [address] [facility] [address]  [phone]           March 13, 2024     Regarding:  Stacy CORINA Castillo  [address]  [facility] [address]                    To Whom It May Concern;     This patient is under my care for a right-sided shoulder pain due to biceps tendinitis, aggravated by his repetitive reaching and lifting at work.  For 2 weeks, from 3/6/2024 to 3/20/2024, he will not use his right upper extremity at work for lifting anything greater than 10 pounds, and will keep his range of motion limited to the level of a tabletop.  He will avoid repetitive overhead reaching or an outstretched right arm.  He will see physical therapy for his right-sided biceps tendinitis in the meantime.              Sincerely,           Willie Talavera MD

## 2024-03-13 NOTE — TELEPHONE ENCOUNTER
[facility] Call Center    Phone Message    May a detailed message be left on voicemail: yes     Reason for Call: Other: pt called requesting for another work restriction note, can care team call him back to discuss? Will need [place]      Action Taken: Other: te    Travel Screening: Not Applicable

## 2024-04-05 ENCOUNTER — THERAPY VISIT (OUTPATIENT)
Dept: PHYSICAL THERAPY | Facility: CLINIC | Age: 39
End: 2024-04-05
Payer: MEDICAID

## 2024-04-05 DIAGNOSIS — M75.21 BICEPS TENDINITIS OF RIGHT UPPER EXTREMITY: ICD-10-CM

## 2024-04-05 DIAGNOSIS — M25.511 ACUTE PAIN OF RIGHT SHOULDER: Primary | ICD-10-CM

## 2024-04-05 PROCEDURE — 97110 THERAPEUTIC EXERCISES: CPT | Mod: GP | Performed by: PHYSICAL THERAPIST

## 2024-04-12 ENCOUNTER — THERAPY VISIT (OUTPATIENT)
Dept: PHYSICAL THERAPY | Facility: CLINIC | Age: 39
End: 2024-04-12
Payer: MEDICAID

## 2024-04-12 DIAGNOSIS — M75.21 BICEPS TENDINITIS OF RIGHT UPPER EXTREMITY: ICD-10-CM

## 2024-04-12 DIAGNOSIS — M25.511 ACUTE PAIN OF RIGHT SHOULDER: Primary | ICD-10-CM

## 2024-04-12 PROCEDURE — 97110 THERAPEUTIC EXERCISES: CPT | Mod: GP

## 2024-04-12 PROCEDURE — 97112 NEUROMUSCULAR REEDUCATION: CPT | Mod: GP
